# Patient Record
Sex: MALE | Race: WHITE | NOT HISPANIC OR LATINO | Employment: OTHER | URBAN - METROPOLITAN AREA
[De-identification: names, ages, dates, MRNs, and addresses within clinical notes are randomized per-mention and may not be internally consistent; named-entity substitution may affect disease eponyms.]

---

## 2017-01-06 ENCOUNTER — TELEPHONE (OUTPATIENT)
Dept: FAMILY MEDICINE CLINIC | Facility: CLINIC | Age: 72
End: 2017-01-06

## 2017-01-06 NOTE — TELEPHONE ENCOUNTER
----- Message from Germania Mosley sent at 1/6/2017  2:03 PM EST -----  Regarding: verbal physical therapy order  Contact: [phone]  Addie from [facility] would like to get a verbal order to move patient's physical therapy to next week bc his wife has been in the hospital all week.     Gave verbal consent to continue PT.  Kurtis, CMA

## 2017-03-27 ENCOUNTER — OFFICE VISIT (OUTPATIENT)
Dept: FAMILY MEDICINE CLINIC | Facility: CLINIC | Age: 72
End: 2017-03-27

## 2017-03-27 VITALS
SYSTOLIC BLOOD PRESSURE: 126 MMHG | TEMPERATURE: 97.9 F | BODY MASS INDEX: 24.99 KG/M2 | WEIGHT: 201 LBS | DIASTOLIC BLOOD PRESSURE: 62 MMHG | HEIGHT: 75 IN | HEART RATE: 94 BPM | OXYGEN SATURATION: 96 %

## 2017-03-27 DIAGNOSIS — K43.2 INCISIONAL HERNIA, WITHOUT OBSTRUCTION OR GANGRENE: Primary | ICD-10-CM

## 2017-03-27 PROCEDURE — 99213 OFFICE O/P EST LOW 20 MIN: CPT | Performed by: FAMILY MEDICINE

## 2017-03-27 NOTE — PROGRESS NOTES
Subjective   Rylan Hudson is a 71 y.o. male.     History of Present Illness   Left abdominal pain and incision hernia.  Reoccurring pain since surgery.   The following portions of the patient's history were reviewed and updated as appropriate: allergies, current medications, past family history, past medical history, past social history, past surgical history and problem list.    Review of Systems   Constitutional: Negative for appetite change, chills, diaphoresis, fatigue, fever and unexpected weight change.   HENT: Negative for congestion, ear pain, mouth sores, postnasal drip, rhinorrhea, sinus pressure, sneezing, sore throat and trouble swallowing.    Eyes: Negative for pain, redness and visual disturbance.   Respiratory: Negative for apnea, cough, chest tightness, shortness of breath and wheezing.    Cardiovascular: Negative for chest pain, palpitations and leg swelling.   Gastrointestinal: Negative for abdominal distention, blood in stool, constipation, diarrhea and nausea.   Endocrine: Negative for cold intolerance, polydipsia, polyphagia and polyuria.   Genitourinary: Negative for difficulty urinating, dysuria, enuresis, flank pain and urgency.   Musculoskeletal: Negative for arthralgias, back pain, joint swelling, myalgias and neck pain.   Skin: Negative for color change, rash and wound.   Neurological: Negative for dizziness, syncope, weakness, light-headedness and numbness.   Hematological: Negative for adenopathy.   Psychiatric/Behavioral: Negative for agitation, behavioral problems and confusion. The patient is not nervous/anxious.        Objective   Physical Exam   Constitutional: He appears well-developed.   Pulmonary/Chest: Effort normal.   Abdominal:   Tender lower part of abdominal incision with minimal bulge.   Musculoskeletal: He exhibits no edema.   Vitals reviewed.      Assessment/Plan   Rylan was seen today for hernia.    Diagnoses and all orders for this visit:    Incisional hernia,  without obstruction or gangrene    refer to surgery, done at .

## 2017-09-05 ENCOUNTER — OFFICE VISIT (OUTPATIENT)
Dept: FAMILY MEDICINE CLINIC | Facility: CLINIC | Age: 72
End: 2017-09-05

## 2017-09-05 VITALS
SYSTOLIC BLOOD PRESSURE: 118 MMHG | HEART RATE: 84 BPM | RESPIRATION RATE: 16 BRPM | TEMPERATURE: 97.7 F | WEIGHT: 214 LBS | BODY MASS INDEX: 26.75 KG/M2 | DIASTOLIC BLOOD PRESSURE: 60 MMHG

## 2017-09-05 DIAGNOSIS — J06.9 ACUTE URI: Primary | ICD-10-CM

## 2017-09-05 PROCEDURE — 99213 OFFICE O/P EST LOW 20 MIN: CPT | Performed by: FAMILY MEDICINE

## 2017-09-05 RX ORDER — AZITHROMYCIN 250 MG/1
TABLET, FILM COATED ORAL
Qty: 5 TABLET | Refills: 0 | Status: SHIPPED | OUTPATIENT
Start: 2017-09-05 | End: 2017-12-01

## 2017-09-05 NOTE — PROGRESS NOTES
Subjective   Rylan Hudson is a 72 y.o. male.     History of Present Illness   Sore throat, drainage and sinus pressure four days ago. No fever.  Seen by urology last week at  with good report, no reocurance.   The following portions of the patient's history were reviewed and updated as appropriate: allergies, current medications, past family history, past medical history, past social history, past surgical history and problem list.    Review of Systems   HENT: Positive for congestion, postnasal drip, sinus pressure, sneezing and sore throat.    Respiratory: Positive for cough.    Neurological: Positive for headaches.       Objective   Physical Exam   HENT:   Left Ear: Decreased hearing (deaf) is noted.   Mouth/Throat: Oropharynx is clear and moist.   Head congestion.    Neck: Neck supple.   Pulmonary/Chest: Breath sounds normal.   Lymphadenopathy:     He has no cervical adenopathy.   Vitals reviewed.      Assessment/Plan   Rylan was seen today for sinusitis.    Diagnoses and all orders for this visit:    Acute URI  -     azithromycin (ZITHROMAX) 250 MG tablet; Take 2 tablets the first day, then 1 tablet daily for 4 days.

## 2017-12-01 ENCOUNTER — OFFICE VISIT (OUTPATIENT)
Dept: FAMILY MEDICINE CLINIC | Facility: CLINIC | Age: 72
End: 2017-12-01

## 2017-12-01 VITALS
WEIGHT: 227 LBS | SYSTOLIC BLOOD PRESSURE: 112 MMHG | HEART RATE: 72 BPM | RESPIRATION RATE: 16 BRPM | BODY MASS INDEX: 28.37 KG/M2 | DIASTOLIC BLOOD PRESSURE: 78 MMHG | TEMPERATURE: 97.8 F

## 2017-12-01 DIAGNOSIS — E53.8 VITAMIN B 12 DEFICIENCY: Primary | ICD-10-CM

## 2017-12-01 PROCEDURE — 99213 OFFICE O/P EST LOW 20 MIN: CPT | Performed by: FAMILY MEDICINE

## 2017-12-01 NOTE — PROGRESS NOTES
Subjective   Rylan Hudson is a 72 y.o. male.     History of Present Illness   Continues left flank acute discomfort at site of surgery port site from kidney surgery from Oct 24, 2016.  Seen [facility] and no findings on scan.  Did well with hernia repair.   Gaining weight. Dry scalp with dandruff not helped using commercial shampoos.   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 Using hearing aids that help.   Using compression socks for ankle swelling. Varicose veins.   Taking vitamin B 12, discovered low level at  and has helped leg swelling.   The following portions of the patient's history were reviewed and updated as appropriate: allergies, current medications, past family history, past medical history, past social history, past surgical history and problem list.    Review of Systems   Constitutional: Negative.    HENT: Positive for hearing loss.    Respiratory: Negative.        Objective   Physical Exam   Constitutional: He appears well-developed and well-nourished.   Pulmonary/Chest: Effort normal.   Abdominal: Soft. He exhibits no distension.   Vitals reviewed.      Assessment/Plan   Rylan was seen today for follow-up.    Diagnoses and all orders for this visit:    Vitamin B 12 deficiency      Plans to enroll in [employer] for strength building.

## 2018-01-26 ENCOUNTER — OFFICE VISIT (OUTPATIENT)
Dept: FAMILY MEDICINE CLINIC | Facility: CLINIC | Age: 73
End: 2018-01-26

## 2018-01-26 VITALS
SYSTOLIC BLOOD PRESSURE: 124 MMHG | WEIGHT: 229 LBS | HEART RATE: 88 BPM | RESPIRATION RATE: 16 BRPM | BODY MASS INDEX: 28.62 KG/M2 | DIASTOLIC BLOOD PRESSURE: 90 MMHG | TEMPERATURE: 97.8 F

## 2018-01-26 DIAGNOSIS — J06.9 ACUTE URI: Primary | ICD-10-CM

## 2018-01-26 PROCEDURE — 99213 OFFICE O/P EST LOW 20 MIN: CPT | Performed by: FAMILY MEDICINE

## 2018-01-26 RX ORDER — AZITHROMYCIN 250 MG/1
TABLET, FILM COATED ORAL
Qty: 6 TABLET | Refills: 0 | Status: SHIPPED | OUTPATIENT
Start: 2018-01-26 | End: 2018-03-30

## 2018-01-26 NOTE — PROGRESS NOTES
Subjective   Rylan Hudson is a 72 y.o. male.     History of Present Illness   Head congestion, headache three days, throat irritation.  Generally does not feel good. Woke today feverish.  Took Tylenol. Today head pressure.    Followed at  for ear tumor and left face cramps.   The following portions of the patient's history were reviewed and updated as appropriate: allergies, current medications, past family history, past medical history, past social history, past surgical history and problem list.    Review of Systems   Constitutional: Negative.    HENT: Positive for congestion and sore throat.    Respiratory: Negative.    Cardiovascular: Negative.    Gastrointestinal: Negative.    Musculoskeletal: Positive for myalgias and neck pain.   Neurological: Positive for headaches.       Objective   Physical Exam   Constitutional: He appears well-developed. No distress.   HENT:   Mouth/Throat: Oropharynx is clear and moist.   Moderate head congestion.    Neck: Neck supple.   Pulmonary/Chest: Breath sounds normal.   Lymphadenopathy:     He has no cervical adenopathy.   Vitals reviewed.      Assessment/Plan   Rylan was seen today for uri.    Diagnoses and all orders for this visit:    Acute URI  -     azithromycin (ZITHROMAX) 250 MG tablet; Take 2 tablets the first day, then 1 tablet daily on days 2 through 5.

## 2018-03-30 ENCOUNTER — OFFICE VISIT (OUTPATIENT)
Dept: FAMILY MEDICINE CLINIC | Facility: CLINIC | Age: 73
End: 2018-03-30

## 2018-03-30 VITALS
OXYGEN SATURATION: 97 % | TEMPERATURE: 97.9 F | SYSTOLIC BLOOD PRESSURE: 116 MMHG | HEART RATE: 89 BPM | WEIGHT: 237 LBS | DIASTOLIC BLOOD PRESSURE: 70 MMHG | BODY MASS INDEX: 29.62 KG/M2 | RESPIRATION RATE: 16 BRPM

## 2018-03-30 DIAGNOSIS — J06.9 ACUTE URI: Primary | ICD-10-CM

## 2018-03-30 PROCEDURE — 99213 OFFICE O/P EST LOW 20 MIN: CPT | Performed by: FAMILY MEDICINE

## 2018-03-30 RX ORDER — DEXTROMETHORPHAN HYDROBROMIDE AND PROMETHAZINE HYDROCHLORIDE 15; 6.25 MG/5ML; MG/5ML
5 SYRUP ORAL 4 TIMES DAILY PRN
Qty: 118 ML | Refills: 0 | Status: SHIPPED | OUTPATIENT
Start: 2018-03-30 | End: 2018-11-30

## 2018-03-30 RX ORDER — AZITHROMYCIN 250 MG/1
TABLET, FILM COATED ORAL
Qty: 6 TABLET | Refills: 0 | Status: SHIPPED | OUTPATIENT
Start: 2018-03-30 | End: 2018-04-06

## 2018-03-30 NOTE — PROGRESS NOTES
Subjective   Rylan Hudson is a 72 y.o. male.     History of Present Illness   Three days of congestion, not feeling well.  No fever. No chest discomfort.  Today throat irritation. Appetite good, loose stool initially.   The following portions of the patient's history were reviewed and updated as appropriate: allergies, current medications, past family history, past medical history, past social history, past surgical history and problem list.    Review of Systems   Constitutional: Negative for fever.   HENT: Positive for congestion. Negative for sore throat.    Respiratory: Negative for cough and shortness of breath.    Neurological: Positive for dizziness.       Objective   Physical Exam   Constitutional: He appears well-developed.   HENT:   Mouth/Throat: Oropharynx is clear and moist.   Head congestion   Neck: Neck supple.   Pulmonary/Chest: Breath sounds normal.   Lymphadenopathy:     He has no cervical adenopathy.   Vitals reviewed.      Assessment/Plan   Rylan was seen today for uri.    Diagnoses and all orders for this visit:    Acute URI  -     azithromycin (ZITHROMAX) 250 MG tablet; Take 2 tablets the first day, then 1 tablet daily on days 2 through 5.  -     promethazine-dextromethorphan (PROMETHAZINE-DM) 6.25-15 MG/5ML syrup; Take 5 mL by mouth 4 (Four) Times a Day As Needed for Cough.

## 2018-04-06 ENCOUNTER — OFFICE VISIT (OUTPATIENT)
Dept: FAMILY MEDICINE CLINIC | Facility: CLINIC | Age: 73
End: 2018-04-06

## 2018-04-06 VITALS
WEIGHT: 234.4 LBS | DIASTOLIC BLOOD PRESSURE: 68 MMHG | TEMPERATURE: 97.7 F | SYSTOLIC BLOOD PRESSURE: 119 MMHG | HEART RATE: 49 BPM | BODY MASS INDEX: 29.3 KG/M2 | OXYGEN SATURATION: 96 %

## 2018-04-06 DIAGNOSIS — J45.31 MILD PERSISTENT ASTHMATIC BRONCHITIS WITH ACUTE EXACERBATION: ICD-10-CM

## 2018-04-06 DIAGNOSIS — S46.811A TRAPEZIUS STRAIN, RIGHT, INITIAL ENCOUNTER: Primary | ICD-10-CM

## 2018-04-06 DIAGNOSIS — J06.9 ACUTE URI: ICD-10-CM

## 2018-04-06 PROCEDURE — 20552 NJX 1/MLT TRIGGER POINT 1/2: CPT | Performed by: FAMILY MEDICINE

## 2018-04-06 PROCEDURE — 99213 OFFICE O/P EST LOW 20 MIN: CPT | Performed by: FAMILY MEDICINE

## 2018-04-06 RX ORDER — METHYLPREDNISOLONE ACETATE 40 MG/ML
40 INJECTION, SUSPENSION INTRA-ARTICULAR; INTRALESIONAL; INTRAMUSCULAR; SOFT TISSUE ONCE
Status: COMPLETED | OUTPATIENT
Start: 2018-04-06 | End: 2018-04-18

## 2018-04-06 RX ORDER — PREDNISONE 10 MG/1
TABLET ORAL
Qty: 4 TABLET | Refills: 0 | Status: SHIPPED | OUTPATIENT
Start: 2018-04-06 | End: 2018-11-30

## 2018-04-06 RX ORDER — BENZONATATE 100 MG/1
100 CAPSULE ORAL 3 TIMES DAILY PRN
Qty: 20 CAPSULE | Refills: 0 | Status: SHIPPED | OUTPATIENT
Start: 2018-04-06 | End: 2018-11-30

## 2018-04-06 NOTE — PROGRESS NOTES
Subjective   Rylan Hudson is a 72 y.o. male.     Cough   This is a new problem. The current episode started 1 to 4 weeks ago. The problem has been gradually improving. The cough is productive of sputum. Associated symptoms include myalgias (right shoulder.) and shortness of breath. Pertinent negatives include no chest pain, chills or fever. The symptoms are aggravated by lying down (deep breaths). He has tried OTC cough suppressant and prescription cough suppressant for the symptoms. The treatment provided mild relief.        The following portions of the patient's history were reviewed and updated as appropriate: allergies, current medications, past social history and problem list.    Review of Systems   Constitutional: Negative for chills and fever.   Respiratory: Positive for cough and shortness of breath.    Cardiovascular: Negative for chest pain.   Musculoskeletal: Positive for myalgias (right shoulder.).       Objective   /68 (BP Location: Left arm, Patient Position: Sitting, Cuff Size: Adult)   Pulse (!) 49   Temp 97.7 °F (36.5 °C) (Oral)   Wt 106 kg (234 lb 6.4 oz)   SpO2 96%   BMI 29.30 kg/m²   Physical Exam   Constitutional: He appears well-developed and well-nourished.   Cardiovascular: Normal rate and regular rhythm.    Pulmonary/Chest: Effort normal and breath sounds normal.   Musculoskeletal: He exhibits tenderness (right upper back).   Nursing note and vitals reviewed.      Assessment/Plan   Problem List Items Addressed This Visit     None      Visit Diagnoses     Trapezius strain, right, initial encounter    -  Primary    Relevant Orders    Inject Trigger Point, 1 or 2    Acute URI        Mild persistent asthmatic bronchitis with acute exacerbation            Inject Trigger Point, 1 or 2  Date/Time: 4/6/2018 3:03 PM  Performed by: JUDAH QUIROGA  Authorized by: JUDAH QUIROGA   Consent: Verbal consent obtained.  Consent given by: patient  Patient understanding: patient states  understanding of the procedure being performed  Patient consent: the patient's understanding of the procedure matches consent given  Patient identity confirmed: verbally with patient  Local anesthesia used: yes    Anesthesia:  Local anesthesia used: yes  Local Anesthetic: lidocaine 1% without epinephrine  Anesthetic total: 1 mL    Sedation:  Patient sedated: no  Patient tolerance: Patient tolerated the procedure well with no immediate complications  Comments: 40 mg of Depo Medrol with 1 cc lidocaine.  [id number] exp. 2/2019            Drink plenty fluids.    Continue medications as doing.      Finish the prednisone 10 mg 3 a day and Add Prednisone 10 mg daily for 4 more days. #4+0.    Rx for Benzonatate 100 mg three times a day #20+0.    Follow up as needed.              Scribed for Dr Blair Fermin by Luh Early CMA.          I, Blair Fermin MD, personally performed the services described in this documentation, as scribed by Luh Early in my presence, and is both accurate and complete.

## 2018-04-18 RX ADMIN — METHYLPREDNISOLONE ACETATE 40 MG: 40 INJECTION, SUSPENSION INTRA-ARTICULAR; INTRALESIONAL; INTRAMUSCULAR; SOFT TISSUE at 14:04

## 2018-11-30 ENCOUNTER — OFFICE VISIT (OUTPATIENT)
Dept: FAMILY MEDICINE CLINIC | Facility: CLINIC | Age: 73
End: 2018-11-30

## 2018-11-30 VITALS
HEART RATE: 92 BPM | DIASTOLIC BLOOD PRESSURE: 90 MMHG | WEIGHT: 247 LBS | TEMPERATURE: 97.6 F | OXYGEN SATURATION: 97 % | SYSTOLIC BLOOD PRESSURE: 126 MMHG | BODY MASS INDEX: 30.87 KG/M2 | RESPIRATION RATE: 16 BRPM

## 2018-11-30 DIAGNOSIS — J01.90 ACUTE SINUSITIS, RECURRENCE NOT SPECIFIED, UNSPECIFIED LOCATION: Primary | ICD-10-CM

## 2018-11-30 PROCEDURE — 99213 OFFICE O/P EST LOW 20 MIN: CPT | Performed by: FAMILY MEDICINE

## 2018-11-30 RX ORDER — FLUTICASONE PROPIONATE 50 MCG
2 SPRAY, SUSPENSION (ML) NASAL DAILY
Qty: 18 ML | Refills: 1 | Status: SHIPPED | OUTPATIENT
Start: 2018-11-30 | End: 2019-12-06

## 2018-11-30 RX ORDER — CEFUROXIME AXETIL 250 MG/1
250 TABLET ORAL 2 TIMES DAILY
Qty: 20 TABLET | Refills: 0 | Status: SHIPPED | OUTPATIENT
Start: 2018-11-30 | End: 2019-12-06

## 2018-11-30 NOTE — PROGRESS NOTES
Subjective   Rylan Hudson is a 73 y.o. male.     History of Present Illness   Sinus congestion several days, little drainage, not painful.  Breathing through mouth. Took Tylenol and no nose spray.   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   The following portions of the patient's history were reviewed and updated as appropriate: allergies, current medications, past family history, past medical history, past social history, past surgical history and problem list.    Review of Systems   HENT: Positive for congestion and sinus pressure. Negative for postnasal drip and sore throat.    Respiratory: Negative.    Cardiovascular: Negative.        Objective   Physical Exam   Constitutional: He appears well-developed and well-nourished.   Uses rolling walker   HENT:   Right Ear: External ear normal.   Mouth/Throat: Oropharynx is clear and moist.   Small nodular growth left posterior canal, smaller than on past examination.   Neck: Neck supple.   Pulmonary/Chest: Breath sounds normal.   Lymphadenopathy:     He has no cervical adenopathy.   Vitals reviewed.      Assessment/Plan   Rylan was seen today for sinusitis.    Diagnoses and all orders for this visit:    Acute sinusitis, recurrence not specified, unspecified location  -     cefuroxime (CEFTIN) 250 MG tablet; Take 1 tablet by mouth 2 (Two) Times a Day.  -     fluticasone (FLONASE) 50 MCG/ACT nasal spray; 2 sprays into the nostril(s) as directed by provider Daily.

## 2019-12-06 ENCOUNTER — OFFICE VISIT (OUTPATIENT)
Dept: FAMILY MEDICINE CLINIC | Facility: CLINIC | Age: 74
End: 2019-12-06

## 2019-12-06 VITALS
SYSTOLIC BLOOD PRESSURE: 136 MMHG | BODY MASS INDEX: 30.37 KG/M2 | HEIGHT: 72 IN | RESPIRATION RATE: 16 BRPM | TEMPERATURE: 98.6 F | DIASTOLIC BLOOD PRESSURE: 64 MMHG | WEIGHT: 224.2 LBS | HEART RATE: 104 BPM | OXYGEN SATURATION: 96 %

## 2019-12-06 DIAGNOSIS — K52.9 GASTROENTERITIS: Primary | ICD-10-CM

## 2019-12-06 PROCEDURE — 99213 OFFICE O/P EST LOW 20 MIN: CPT | Performed by: FAMILY MEDICINE

## 2019-12-06 RX ORDER — CIPROFLOXACIN 500 MG/1
500 TABLET, FILM COATED ORAL 2 TIMES DAILY
Qty: 14 TABLET | Refills: 0 | Status: SHIPPED | OUTPATIENT
Start: 2019-12-06 | End: 2019-12-14

## 2019-12-06 NOTE — PROGRESS NOTES
"Subjective   Rylan Hudson is a 74 y.o. male seen today for Diarrhea (food/fluids both, since before Thanksgiving).     Diarrhea    The current episode started 1 to 4 weeks ago (10- 14 days). The problem has been unchanged. The stool consistency is described as watery. Associated symptoms include abdominal pain, chills (episodes) and sweats. Pertinent negatives include no fever, vomiting or weight loss. There are no known risk factors. He has tried increased fluids for the symptoms. The treatment provided no relief.        The following portions of the patient's history were reviewed and updated as appropriate: allergies, current medications, past social history and problem list.    Review of Systems   Constitutional: Positive for chills (episodes). Negative for fever and weight loss.   Respiratory: Negative for shortness of breath.    Cardiovascular: Negative for chest pain.   Gastrointestinal: Positive for abdominal pain, diarrhea and nausea (mild). Negative for blood in stool and vomiting.   Genitourinary: Negative.        Objective   /64   Pulse 104   Temp 98.6 °F (37 °C) (Temporal)   Resp 16   Ht 182.9 cm (72\")   Wt 102 kg (224 lb 3.2 oz)   SpO2 96%   BMI 30.41 kg/m²   Physical Exam   Constitutional: He appears well-developed and well-nourished.   Cardiovascular: Regular rhythm.   Pulmonary/Chest: Effort normal and breath sounds normal.   Abdominal: Soft. Bowel sounds are normal. There is no tenderness.   Nursing note and vitals reviewed.      Assessment/Plan   Problem List Items Addressed This Visit     None      Visit Diagnoses     Gastroenteritis    -  Primary    Relevant Medications    ciprofloxacin (CIPRO) 500 MG tablet    Other Relevant Orders    Clostridium Difficile Toxin - Stool, Per Rectum    Stool Culture (Reference Lab) - Stool, Per Rectum        I suspect this represents a gastroenteritis secondary to food poisoning.  Will check cultures and for C. difficile and treat with Cipro.    "   Drink plenty fluids.    Check stool culture and stool for C diff.    Rx for Ciprofloxacin 500 mg twice a day #14+0.    Follow up as needed.            Scribed for Dr Blair Fermin by Luh Early CMA.          I, Blair Fermin MD, personally performed the services described in this documentation, as scribed by Luh Early in my presence, and is both accurate and complete.        (Please note that portions of this note were completed with a voice recognition program. Efforts were made to edit the dictations,but occasionally words are mis transcribed.)

## 2019-12-07 ENCOUNTER — LAB (OUTPATIENT)
Dept: FAMILY MEDICINE CLINIC | Facility: CLINIC | Age: 74
End: 2019-12-07

## 2019-12-07 DIAGNOSIS — K52.9 GASTROENTERITIS: ICD-10-CM

## 2019-12-10 ENCOUNTER — TELEPHONE (OUTPATIENT)
Dept: FAMILY MEDICINE CLINIC | Facility: CLINIC | Age: 74
End: 2019-12-10

## 2019-12-10 NOTE — TELEPHONE ENCOUNTER
SPOKE WITH ANGEL, ADVISED HER THAT THE STOOL RESULTS ARE NOT IN CHART, SHE SAID HE WAS DOING OK EATING BUT TODAY NOT FEELING WELL AND WITH DIARRHEA. TOLD HER DR QUIROGA WOULD BE BACK IN TOMORROW AND YOU WOULD BE ABLE TO CALL HER.

## 2019-12-10 NOTE — TELEPHONE ENCOUNTER
PT wife called inquiring about his lab results. Wife stated  is not doing any better and would like to know what the next step would be? Please call back and advise.

## 2019-12-11 ENCOUNTER — TELEPHONE (OUTPATIENT)
Dept: FAMILY MEDICINE CLINIC | Facility: CLINIC | Age: 74
End: 2019-12-11

## 2019-12-11 NOTE — TELEPHONE ENCOUNTER
I spoke to the patient's spouse on the telephone.  She tells me that the diarrhea seemed to be returning yesterday but today it has improved.  He is having several bowel movements a day most of which are slightly formed none of which are liquid.  I recommended that they continue using a probiotic in the form of align twice a day.  He is finished out his Cipro.  The cultures and check first C. difficile are still pending.  I recommended that the patient's spouse call me again tomorrow so that we can discuss the expected lab results.

## 2019-12-12 NOTE — TELEPHONE ENCOUNTER
[facility] called wanted to let Dr. Fermin know they cant add the test Cdiff a and B toxin to the lab they already have Please give call back to [phone]    Please advise and give call back

## 2019-12-13 LAB
BACTERIA SPEC CULT: NORMAL
BACTERIA SPEC CULT: NORMAL
CAMPYLOBACTER STL CULT: NORMAL
E COLI SXT STL QL IA: NEGATIVE
Lab: NORMAL
SALM + SHIG STL CULT: NORMAL

## 2019-12-14 DIAGNOSIS — K52.9 ENTERITIS: Primary | ICD-10-CM

## 2019-12-14 RX ORDER — METRONIDAZOLE 500 MG/1
500 TABLET ORAL 3 TIMES DAILY
Qty: 30 TABLET | Refills: 0 | Status: SHIPPED | OUTPATIENT
Start: 2019-12-14 | End: 2019-12-23

## 2019-12-23 ENCOUNTER — TELEPHONE (OUTPATIENT)
Dept: FAMILY MEDICINE CLINIC | Facility: CLINIC | Age: 74
End: 2019-12-23

## 2019-12-23 ENCOUNTER — TELEPHONE (OUTPATIENT)
Dept: PEDIATRICS | Facility: OTHER | Age: 74
End: 2019-12-23

## 2019-12-23 RX ORDER — METRONIDAZOLE 500 MG/1
500 TABLET ORAL 3 TIMES DAILY
Qty: 15 TABLET | Refills: 0 | OUTPATIENT
Start: 2019-12-23 | End: 2019-12-26

## 2019-12-23 NOTE — TELEPHONE ENCOUNTER
Patients wife was calling back (see previous message)  regarding his current condition please advise and call wife back tonight [phone]

## 2019-12-23 NOTE — TELEPHONE ENCOUNTER
Pt's wife states that dr bird was suppose call him in a script for his METRONIDAZOLE 5OO MG and she states that when she went to go  the script she was advised that nothing had been called in to the pharmacy. Pt's wife states that he has enough for tonight and will need to get this filled asap as he is suppose to take another dose a 6am. Please give pt a call back at [phone]

## 2019-12-23 NOTE — TELEPHONE ENCOUNTER
Pt is being treated for cdif. He finished his medication this morning, but his wife states that he's not doing much better. She is wanting a call back to discuss maybe another medication.  The pt has an appt on 12/26 but the pt's wife doesn't want him to go several days without any more of the medication.    Ruthie, pt's wife [phone]

## 2019-12-24 NOTE — TELEPHONE ENCOUNTER
Pt's pharmacy called and said that the pt's wife was there to get medication that Dr. Fermin was suppose to call in and they didn't have it, it wasn't escribed it was called in and vasiliy said they don't have record of who he talked to or the medication at all, if this could be escribed or called in asap that would be great.

## 2019-12-26 ENCOUNTER — LAB REQUISITION (OUTPATIENT)
Dept: LAB | Facility: HOSPITAL | Age: 74
End: 2019-12-26

## 2019-12-26 ENCOUNTER — OFFICE VISIT (OUTPATIENT)
Dept: FAMILY MEDICINE CLINIC | Facility: CLINIC | Age: 74
End: 2019-12-26

## 2019-12-26 VITALS
DIASTOLIC BLOOD PRESSURE: 78 MMHG | OXYGEN SATURATION: 95 % | HEIGHT: 72 IN | TEMPERATURE: 98 F | WEIGHT: 236.7 LBS | HEART RATE: 131 BPM | RESPIRATION RATE: 20 BRPM | BODY MASS INDEX: 32.06 KG/M2 | SYSTOLIC BLOOD PRESSURE: 130 MMHG

## 2019-12-26 DIAGNOSIS — R10.84 GENERALIZED ABDOMINAL PAIN: ICD-10-CM

## 2019-12-26 DIAGNOSIS — K52.9 ENTERITIS: ICD-10-CM

## 2019-12-26 DIAGNOSIS — R63.0 ANOREXIA: ICD-10-CM

## 2019-12-26 DIAGNOSIS — R10.84 GENERALIZED ABDOMINAL PAIN: Primary | ICD-10-CM

## 2019-12-26 DIAGNOSIS — Z00.00 ROUTINE GENERAL MEDICAL EXAMINATION AT A HEALTH CARE FACILITY: ICD-10-CM

## 2019-12-26 LAB — C DIFF TOX GENS STL QL NAA+PROBE: NOT DETECTED

## 2019-12-26 PROCEDURE — 87493 C DIFF AMPLIFIED PROBE: CPT

## 2019-12-26 PROCEDURE — 99213 OFFICE O/P EST LOW 20 MIN: CPT | Performed by: FAMILY MEDICINE

## 2019-12-26 NOTE — PROGRESS NOTES
"Subjective   Rylan Hudson is a 74 y.o. male seen today for office visit (cdif).     History of Present Illness   The patient is here today for a follow up on Diarrhea.    States he is doing some better. States he had a BM prior to todays visit and it was almost normal. Did notice some mucus in it.    Has some intermittent abdominal cramps. Appetite is very poor. Gets nauseated with eating. No energy and decreased strength.  Denies any fever or chills.  Denies any chest pain or shortness of breath.    Has been drinking plenty fluids. Ginger ale and Gatorade.    The following portions of the patient's history were reviewed and updated as appropriate: allergies, current medications, past social history and problem list.    Review of Systems   Constitutional: Positive for appetite change (decreased) and fatigue. Negative for chills and fever.   Respiratory: Negative for shortness of breath.    Cardiovascular: Negative for chest pain.   Gastrointestinal: Positive for diarrhea. Negative for abdominal pain and vomiting.   Neurological: Positive for weakness.       Objective   /78   Pulse (!) 131   Temp 98 °F (36.7 °C)   Resp 20   Ht 182.9 cm (72\")   Wt 107 kg (236 lb 11.2 oz)   SpO2 95%   BMI 32.10 kg/m²   Physical Exam   Constitutional: He appears well-developed and well-nourished.   Pulmonary/Chest: Effort normal and breath sounds normal.   Abdominal: Soft. There is no tenderness.   Hyperactive bowel sounds.   Nursing note and vitals reviewed.      Assessment/Plan   Problem List Items Addressed This Visit     None      Visit Diagnoses     Generalized abdominal pain    -  Primary    Relevant Orders    Lipase (Completed)    Amylase (Completed)    CT Abdomen Pelvis Without Contrast    Anorexia        Enteritis        Relevant Orders    Clostridium Difficile Toxin - Stool, Per Rectum        The patient has had a persistent diarrhea that has improved with treatment with metronidazole.  His previous stool " cultures were negative for enteric pathogens but unfortunately the test for C. difficile toxin was not performed.  We are in the process of rechecking for that.  I will have him go ahead and finish out his 10-day course of metronidazole.  It is possible that his anorexia is related to that medication.  It is less clear as to what is causing his abdominal pain that persists.  We will therefore check a CT of the abdomen and have him return to see me after he has been off of the metronidazole for a few days.  Hopefully his diarrhea will not recur.      Drink plenty fluids.    Stop the Metronidazole.    Continue other medications as doing.    Check a CBC,CMP,Amylase, and Lipase. and drop off the stool specimen for c diff.     Schedule for a CT of the abdomen and pelvis without IV contrast.    Follow up in 4 days.              Scribed for Dr Blair Fermin by Luh Early CMA.          I, Blair Fermin MD, personally performed the services described in this documentation, as scribed by Luh Early in my presence, and is both accurate and complete.        (Please note that portions of this note were completed with a voice recognition program. Efforts were made to edit the dictations,but occasionally words are mis transcribed.)

## 2019-12-27 ENCOUNTER — TELEPHONE (OUTPATIENT)
Dept: FAMILY MEDICINE CLINIC | Facility: CLINIC | Age: 74
End: 2019-12-27

## 2019-12-27 LAB
AMYLASE SERPL-CCNC: 61 U/L (ref 28–100)
LIPASE SERPL-CCNC: 54 U/L (ref 13–60)

## 2019-12-27 NOTE — TELEPHONE ENCOUNTER
APPT IS SCHEDULED FOR 1/2/2020 AT 1  [facility] [facility] W[facility] NOTIFIED

## 2019-12-27 NOTE — TELEPHONE ENCOUNTER
Pt wife called stating [employer] contacted them and stating pt has been approved for the ct scan. Wife was calling to inquire when the appt will be set up. She would like someone to give her a call and let her know when they can come in for the ct scan?

## 2019-12-27 NOTE — TELEPHONE ENCOUNTER
Ruthie said when pt was in office yesterday that the pt was told the doctor wanted to see him on Monday but no appt was made.  Ruthie is wanting to know if he still needs to be seen on Monday or to wait until after the scan on Kai [facility].  Ruthie requesting call back to clarify.

## 2019-12-30 ENCOUNTER — TELEPHONE (OUTPATIENT)
Dept: FAMILY MEDICINE CLINIC | Facility: CLINIC | Age: 74
End: 2019-12-30

## 2019-12-30 NOTE — TELEPHONE ENCOUNTER
Wife of patient calling to get results from the labs run on 12/26/19.  Also calling about the stool specimen results.    Please call at home - [phone]

## 2019-12-30 NOTE — TELEPHONE ENCOUNTER
I spoke to the patient's spouse on the telephone.  I reported the results of his recent laboratory studies.  His amylase and lipase were normal and the stool for C. difficile toxin was negative.  He is feeling well except for being very weak and tired.  He is to undergo a CT of the abdomen this Friday.  We will wait to see him after that.  In the meantime he is to drink plenty of fluids including Gatorade or Sprite as well as fruits and vegetables.

## 2020-01-06 ENCOUNTER — TELEPHONE (OUTPATIENT)
Dept: FAMILY MEDICINE CLINIC | Facility: CLINIC | Age: 75
End: 2020-01-06

## 2020-01-06 NOTE — TELEPHONE ENCOUNTER
I spoke to the patient's spouse who is accompanied by her daughter here at the office today.  I related that the stool for C. difficile toxin returned with negative results.  The patient on CT of the abdomen had findings consistent with a colitis at the mid sigmoid colon.  This therefore could represent a diverticulitis.  I recommended that the patient return to see me here at the office.  He also has complaints of fatigue and I believe we may need to repeat some laboratory studies and see if he should be treated with another course of antibiotics or not.  The family tells me that he is checked in his refusal of getting a colonoscopy.

## 2020-01-08 ENCOUNTER — HOSPITAL ENCOUNTER (INPATIENT)
Facility: HOSPITAL | Age: 75
LOS: 2 days | Discharge: ANOTHER HOSPITAL | End: 2020-01-10
Attending: HOSPITALIST | Admitting: INTERNAL MEDICINE

## 2020-01-08 ENCOUNTER — OFFICE VISIT (OUTPATIENT)
Dept: FAMILY MEDICINE CLINIC | Facility: CLINIC | Age: 75
End: 2020-01-08

## 2020-01-08 ENCOUNTER — APPOINTMENT (OUTPATIENT)
Dept: CT IMAGING | Facility: HOSPITAL | Age: 75
End: 2020-01-08

## 2020-01-08 VITALS
DIASTOLIC BLOOD PRESSURE: 60 MMHG | HEIGHT: 72 IN | SYSTOLIC BLOOD PRESSURE: 120 MMHG | BODY MASS INDEX: 31.42 KG/M2 | HEART RATE: 126 BPM | OXYGEN SATURATION: 95 % | WEIGHT: 232 LBS | RESPIRATION RATE: 22 BRPM | TEMPERATURE: 101.9 F

## 2020-01-08 DIAGNOSIS — R50.9 FEVER AND CHILLS: Primary | ICD-10-CM

## 2020-01-08 DIAGNOSIS — K57.32 SIGMOID DIVERTICULITIS: ICD-10-CM

## 2020-01-08 PROBLEM — A41.9 SEPSIS (HCC): Status: ACTIVE | Noted: 2020-01-08

## 2020-01-08 PROBLEM — IMO0002 SOLITARY KIDNEY: Status: ACTIVE | Noted: 2020-01-08

## 2020-01-08 PROBLEM — K57.92 DIVERTICULITIS: Status: ACTIVE | Noted: 2020-01-08

## 2020-01-08 LAB
ALBUMIN SERPL-MCNC: 3.3 G/DL (ref 3.5–5.2)
ALBUMIN/GLOB SERPL: 0.9 G/DL
ALP SERPL-CCNC: 58 U/L (ref 39–117)
ALT SERPL W P-5'-P-CCNC: 9 U/L (ref 1–41)
ANION GAP SERPL CALCULATED.3IONS-SCNC: 12 MMOL/L (ref 5–15)
AST SERPL-CCNC: 12 U/L (ref 1–40)
BILIRUB SERPL-MCNC: 0.6 MG/DL (ref 0.2–1.2)
BUN BLD-MCNC: 31 MG/DL (ref 8–23)
BUN/CREAT SERPL: 21.7 (ref 7–25)
CALCIUM SPEC-SCNC: 8.8 MG/DL (ref 8.6–10.5)
CHLORIDE SERPL-SCNC: 101 MMOL/L (ref 98–107)
CO2 SERPL-SCNC: 23 MMOL/L (ref 22–29)
CREAT BLD-MCNC: 1.43 MG/DL (ref 0.76–1.27)
EXPIRATION DATE: NORMAL
FLUAV AG NPH QL: NEGATIVE
FLUBV AG NPH QL: NEGATIVE
GFR SERPL CREATININE-BSD FRML MDRD: 48 ML/MIN/1.73
GLOBULIN UR ELPH-MCNC: 3.8 GM/DL
GLUCOSE BLD-MCNC: 110 MG/DL (ref 65–99)
INTERNAL CONTROL: NORMAL
Lab: NORMAL
POTASSIUM BLD-SCNC: 3.7 MMOL/L (ref 3.5–5.2)
PROCALCITONIN SERPL-MCNC: 0.34 NG/ML (ref 0.1–0.25)
PROT SERPL-MCNC: 7.1 G/DL (ref 6–8.5)
SODIUM BLD-SCNC: 136 MMOL/L (ref 136–145)

## 2020-01-08 PROCEDURE — 86900 BLOOD TYPING SEROLOGIC ABO: CPT

## 2020-01-08 PROCEDURE — 99223 1ST HOSP IP/OBS HIGH 75: CPT | Performed by: HOSPITALIST

## 2020-01-08 PROCEDURE — 84145 PROCALCITONIN (PCT): CPT | Performed by: PHYSICIAN ASSISTANT

## 2020-01-08 PROCEDURE — 99214 OFFICE O/P EST MOD 30 MIN: CPT | Performed by: FAMILY MEDICINE

## 2020-01-08 PROCEDURE — 25010000002 MEROPENEM PER 100 MG: Performed by: PHYSICIAN ASSISTANT

## 2020-01-08 PROCEDURE — 87040 BLOOD CULTURE FOR BACTERIA: CPT | Performed by: PHYSICIAN ASSISTANT

## 2020-01-08 PROCEDURE — 85025 COMPLETE CBC W/AUTO DIFF WBC: CPT | Performed by: HOSPITALIST

## 2020-01-08 PROCEDURE — 85060 BLOOD SMEAR INTERPRETATION: CPT | Performed by: INTERNAL MEDICINE

## 2020-01-08 PROCEDURE — 85007 BL SMEAR W/DIFF WBC COUNT: CPT | Performed by: PHYSICIAN ASSISTANT

## 2020-01-08 PROCEDURE — 74176 CT ABD & PELVIS W/O CONTRAST: CPT

## 2020-01-08 PROCEDURE — 85007 BL SMEAR W/DIFF WBC COUNT: CPT | Performed by: INTERNAL MEDICINE

## 2020-01-08 PROCEDURE — 85025 COMPLETE CBC W/AUTO DIFF WBC: CPT | Performed by: PHYSICIAN ASSISTANT

## 2020-01-08 PROCEDURE — 87804 INFLUENZA ASSAY W/OPTIC: CPT | Performed by: FAMILY MEDICINE

## 2020-01-08 PROCEDURE — 86901 BLOOD TYPING SEROLOGIC RH(D): CPT

## 2020-01-08 PROCEDURE — 80053 COMPREHEN METABOLIC PANEL: CPT | Performed by: PHYSICIAN ASSISTANT

## 2020-01-08 RX ORDER — SODIUM CHLORIDE 9 MG/ML
100 INJECTION, SOLUTION INTRAVENOUS CONTINUOUS
Status: DISCONTINUED | OUTPATIENT
Start: 2020-01-08 | End: 2020-01-10 | Stop reason: HOSPADM

## 2020-01-08 RX ORDER — ONDANSETRON 4 MG/1
4 TABLET, FILM COATED ORAL EVERY 6 HOURS PRN
Status: DISCONTINUED | OUTPATIENT
Start: 2020-01-08 | End: 2020-01-10 | Stop reason: HOSPADM

## 2020-01-08 RX ORDER — SODIUM CHLORIDE 0.9 % (FLUSH) 0.9 %
10 SYRINGE (ML) INJECTION EVERY 12 HOURS SCHEDULED
Status: DISCONTINUED | OUTPATIENT
Start: 2020-01-08 | End: 2020-01-10 | Stop reason: HOSPADM

## 2020-01-08 RX ORDER — ACETAMINOPHEN 160 MG/5ML
650 SOLUTION ORAL EVERY 4 HOURS PRN
Status: DISCONTINUED | OUTPATIENT
Start: 2020-01-08 | End: 2020-01-10 | Stop reason: HOSPADM

## 2020-01-08 RX ORDER — CHOLECALCIFEROL (VITAMIN D3) 125 MCG
5 CAPSULE ORAL NIGHTLY PRN
Status: DISCONTINUED | OUTPATIENT
Start: 2020-01-08 | End: 2020-01-10 | Stop reason: HOSPADM

## 2020-01-08 RX ORDER — SODIUM CHLORIDE 0.9 % (FLUSH) 0.9 %
10 SYRINGE (ML) INJECTION AS NEEDED
Status: DISCONTINUED | OUTPATIENT
Start: 2020-01-08 | End: 2020-01-10 | Stop reason: HOSPADM

## 2020-01-08 RX ORDER — UBIDECARENONE 75 MG
50 CAPSULE ORAL DAILY
COMMUNITY
End: 2020-01-10 | Stop reason: HOSPADM

## 2020-01-08 RX ORDER — SODIUM CHLORIDE 9 MG/ML
100 INJECTION, SOLUTION INTRAVENOUS CONTINUOUS
Status: DISCONTINUED | OUTPATIENT
Start: 2020-01-08 | End: 2020-01-08 | Stop reason: SDUPTHER

## 2020-01-08 RX ORDER — ACETAMINOPHEN 325 MG/1
650 TABLET ORAL EVERY 4 HOURS PRN
Status: DISCONTINUED | OUTPATIENT
Start: 2020-01-08 | End: 2020-01-10 | Stop reason: HOSPADM

## 2020-01-08 RX ORDER — ACETAMINOPHEN 650 MG/1
650 SUPPOSITORY RECTAL EVERY 4 HOURS PRN
Status: DISCONTINUED | OUTPATIENT
Start: 2020-01-08 | End: 2020-01-10 | Stop reason: HOSPADM

## 2020-01-08 RX ADMIN — MEROPENEM 1 G: 1 INJECTION, POWDER, FOR SOLUTION INTRAVENOUS at 23:33

## 2020-01-08 RX ADMIN — SODIUM CHLORIDE 100 ML/HR: 9 INJECTION, SOLUTION INTRAVENOUS at 22:49

## 2020-01-08 NOTE — PROGRESS NOTES
"Subjective   Rylan Hudson is a 74 y.o. male seen today for Fever.     Fever    This is a new problem. The current episode started today. The maximum temperature noted was 101 to 101.9 F. The temperature was taken using an oral thermometer. Associated symptoms include coughing, diarrhea (improving) and nausea. Pertinent negatives include no chest pain, sore throat or vomiting. He has tried fluids for the symptoms. The treatment provided mild relief.        The following portions of the patient's history were reviewed and updated as appropriate: allergies, current medications, past social history and problem list.    Review of Systems   Constitutional: Positive for chills, fatigue and fever.   HENT: Negative for sore throat.    Respiratory: Positive for cough. Negative for shortness of breath.    Cardiovascular: Negative for chest pain.   Gastrointestinal: Positive for diarrhea (improving) and nausea. Negative for vomiting.   Genitourinary: Negative.        Objective   /60   Pulse (!) 126   Temp (!) 101.9 °F (38.8 °C)   Resp 22   Ht 182.9 cm (72\")   Wt 105 kg (232 lb)   SpO2 95%   BMI 31.46 kg/m²   Physical Exam   Constitutional: He appears well-developed and well-nourished.   HENT:   Right Ear: External ear normal.   Left Ear: External ear normal.   Mouth/Throat: Oropharynx is clear and moist.   Eyes: Pupils are equal, round, and reactive to light. Conjunctivae and EOM are normal.   Cardiovascular: Normal rate and regular rhythm.   Pulmonary/Chest: Effort normal and breath sounds normal.   Abdominal: Soft. There is no tenderness.   Nursing note and vitals reviewed.      Assessment/Plan   Problem List Items Addressed This Visit     None      Visit Diagnoses     Fever and chills    -  Primary    Relevant Orders    POCT Influenza A/B (Completed)        The patient has a preceding history of diarrhea that initially was thought to represent an enteritis and later was concern mainly for C. difficile " enteritis.  Cultures and testing for C. difficile were negative.  The patient was treated with metronidazole 5 mg twice a day for 10 days.  He did have response to that treatment.  However the diarrhea returned.  A CT of the abdomen is suggestive of sigmoid diverticulitis.  The patient denies any difficulty with dysuria or with cough.  Testing for influenza today is negative.  He is developed a fever today with a temperature at present of 101.9 degrees.  I recommended hospitalization for management of what appears to be sigmoid diverticulitis refractory to outpatient therapy.  I contacted the hospitalist at [facility] Dr. RAZO.  We agreed that inpatient therapy was appropriate.  The patient is amenable to this.  He is being transported by family car to direct admit.      Advised due to continued fatigue and now fever we will transition to Hospitalist for admission.        Scribed for Dr Blair Fermin by Luh Early CMA.          I, Blair Fermin MD, personally performed the services described in this documentation, as scribed by Luh Early in my presence, and is both accurate and complete.        (Please note that portions of this note were completed with a voice recognition program. Efforts were made to edit the dictations,but occasionally words are mis transcribed.)

## 2020-01-09 ENCOUNTER — APPOINTMENT (OUTPATIENT)
Dept: CARDIOLOGY | Facility: HOSPITAL | Age: 75
End: 2020-01-09

## 2020-01-09 PROBLEM — D64.9 ANEMIA: Status: ACTIVE | Noted: 2020-01-09

## 2020-01-09 PROBLEM — D69.6 THROMBOCYTOPENIA: Status: ACTIVE | Noted: 2020-01-09

## 2020-01-09 PROBLEM — D61.818 PANCYTOPENIA: Status: ACTIVE | Noted: 2020-01-09

## 2020-01-09 LAB
ABO GROUP BLD: NORMAL
ABO GROUP BLD: NORMAL
ANION GAP SERPL CALCULATED.3IONS-SCNC: 14 MMOL/L (ref 5–15)
BASOPHILS # BLD AUTO: 0 10*3/MM3 (ref 0–0.2)
BASOPHILS # BLD AUTO: 0 10*3/MM3 (ref 0–0.2)
BASOPHILS # BLD MANUAL: 0 10*3/MM3 (ref 0–0.2)
BASOPHILS NFR BLD AUTO: 0 % (ref 0–1.5)
BH CV ECHO MEAS - BSA(HAYCOCK): 2.4 M^2
BH CV ECHO MEAS - BSA: 2.3 M^2
BH CV ECHO MEAS - BZI_BMI: 32 KILOGRAMS/M^2
BH CV ECHO MEAS - BZI_METRIC_HEIGHT: 182.9 CM
BH CV ECHO MEAS - BZI_METRIC_WEIGHT: 107.1 KG
BH CV LOWER VASCULAR LEFT COMMON FEMORAL AUGMENT: NORMAL
BH CV LOWER VASCULAR LEFT COMMON FEMORAL COMPRESS: NORMAL
BH CV LOWER VASCULAR LEFT COMMON FEMORAL PHASIC: NORMAL
BH CV LOWER VASCULAR LEFT COMMON FEMORAL SPONT: NORMAL
BH CV LOWER VASCULAR RIGHT COMMON FEMORAL AUGMENT: NORMAL
BH CV LOWER VASCULAR RIGHT COMMON FEMORAL COMPRESS: NORMAL
BH CV LOWER VASCULAR RIGHT COMMON FEMORAL PHASIC: NORMAL
BH CV LOWER VASCULAR RIGHT COMMON FEMORAL SPONT: NORMAL
BH CV LOWER VASCULAR RIGHT DISTAL FEMORAL COMPRESS: NORMAL
BH CV LOWER VASCULAR RIGHT GASTRONEMIUS COMPRESS: NORMAL
BH CV LOWER VASCULAR RIGHT GREATER SAPH AK COMPRESS: NORMAL
BH CV LOWER VASCULAR RIGHT GREATER SAPH BK COMPRESS: NORMAL
BH CV LOWER VASCULAR RIGHT LESSER SAPH COMPRESS: NORMAL
BH CV LOWER VASCULAR RIGHT MID FEMORAL AUGMENT: NORMAL
BH CV LOWER VASCULAR RIGHT MID FEMORAL COMPRESS: NORMAL
BH CV LOWER VASCULAR RIGHT MID FEMORAL PHASIC: NORMAL
BH CV LOWER VASCULAR RIGHT MID FEMORAL SPONT: NORMAL
BH CV LOWER VASCULAR RIGHT PERONEAL COMPRESS: NORMAL
BH CV LOWER VASCULAR RIGHT POPLITEAL AUGMENT: NORMAL
BH CV LOWER VASCULAR RIGHT POPLITEAL COMPRESS: NORMAL
BH CV LOWER VASCULAR RIGHT POPLITEAL PHASIC: NORMAL
BH CV LOWER VASCULAR RIGHT POPLITEAL SPONT: NORMAL
BH CV LOWER VASCULAR RIGHT POSTERIOR TIBIAL COMPRESS: NORMAL
BH CV LOWER VASCULAR RIGHT PROFUNDA FEMORAL COMPRESS: NORMAL
BH CV LOWER VASCULAR RIGHT PROXIMAL FEMORAL COMPRESS: NORMAL
BH CV LOWER VASCULAR RIGHT SAPHENOFEMORAL JUNCTION AUGMENT: NORMAL
BH CV LOWER VASCULAR RIGHT SAPHENOFEMORAL JUNCTION COMPRESS: NORMAL
BH CV LOWER VASCULAR RIGHT SAPHENOFEMORAL JUNCTION PHASIC: NORMAL
BH CV LOWER VASCULAR RIGHT SAPHENOFEMORAL JUNCTION SPONT: NORMAL
BLD GP AB SCN SERPL QL: NEGATIVE
BUN BLD-MCNC: 27 MG/DL (ref 8–23)
BUN/CREAT SERPL: 19.9 (ref 7–25)
C DIFF TOX GENS STL QL NAA+PROBE: DETECTED
CALCIUM SPEC-SCNC: 9 MG/DL (ref 8.6–10.5)
CHLORIDE SERPL-SCNC: 98 MMOL/L (ref 98–107)
CO2 SERPL-SCNC: 25 MMOL/L (ref 22–29)
CREAT BLD-MCNC: 1.36 MG/DL (ref 0.76–1.27)
CYTOLOGIST CVX/VAG CYTO: NORMAL
D-LACTATE SERPL-SCNC: 1.1 MMOL/L (ref 0.5–2)
DEPRECATED RDW RBC AUTO: 57.5 FL (ref 37–54)
DEPRECATED RDW RBC AUTO: 58.2 FL (ref 37–54)
DEPRECATED RDW RBC AUTO: 63.1 FL (ref 37–54)
EOSINOPHIL # BLD AUTO: 0 10*3/MM3 (ref 0–0.4)
EOSINOPHIL # BLD AUTO: 0 10*3/MM3 (ref 0–0.4)
EOSINOPHIL # BLD MANUAL: 0 10*3/MM3 (ref 0–0.4)
EOSINOPHIL NFR BLD AUTO: 0 % (ref 0.3–6.2)
EOSINOPHIL NFR BLD AUTO: 0 % (ref 0.3–6.2)
EOSINOPHIL NFR BLD MANUAL: 0 % (ref 0.3–6.2)
ERYTHROCYTE [DISTWIDTH] IN BLOOD BY AUTOMATED COUNT: 16.3 % (ref 12.3–15.4)
ERYTHROCYTE [DISTWIDTH] IN BLOOD BY AUTOMATED COUNT: 16.7 % (ref 12.3–15.4)
ERYTHROCYTE [DISTWIDTH] IN BLOOD BY AUTOMATED COUNT: 17.5 % (ref 12.3–15.4)
FERRITIN SERPL-MCNC: 2161 NG/ML (ref 30–400)
FOLATE SERPL-MCNC: 17.5 NG/ML (ref 4.78–24.2)
GFR SERPL CREATININE-BSD FRML MDRD: 51 ML/MIN/1.73
GLUCOSE BLD-MCNC: 102 MG/DL (ref 65–99)
HCT VFR BLD AUTO: 14.3 % (ref 37.5–51)
HCT VFR BLD AUTO: 14.6 % (ref 37.5–51)
HCT VFR BLD AUTO: 23.3 % (ref 37.5–51)
HGB BLD-MCNC: 5 G/DL (ref 13–17.7)
HGB BLD-MCNC: 5 G/DL (ref 13–17.7)
HGB BLD-MCNC: 7.6 G/DL (ref 13–17.7)
IMM GRANULOCYTES # BLD AUTO: 0 10*3/MM3 (ref 0–0.05)
IMM GRANULOCYTES # BLD AUTO: 0.02 10*3/MM3 (ref 0–0.05)
IMM GRANULOCYTES NFR BLD AUTO: 0 % (ref 0–0.5)
IMM GRANULOCYTES NFR BLD AUTO: 3.8 % (ref 0–0.5)
IRON 24H UR-MRATE: 204 MCG/DL (ref 59–158)
IRON SATN MFR SERPL: 82 % (ref 20–50)
LYMPHOCYTES # BLD AUTO: 0.37 10*3/MM3 (ref 0.7–3.1)
LYMPHOCYTES # BLD AUTO: 0.4 10*3/MM3 (ref 0.7–3.1)
LYMPHOCYTES # BLD MANUAL: 0.51 10*3/MM3 (ref 0.7–3.1)
LYMPHOCYTES NFR BLD AUTO: 76.9 % (ref 19.6–45.3)
LYMPHOCYTES NFR BLD AUTO: 77.1 % (ref 19.6–45.3)
LYMPHOCYTES NFR BLD MANUAL: 0 % (ref 5–12)
LYMPHOCYTES NFR BLD MANUAL: 96.2 % (ref 19.6–45.3)
MACROCYTES BLD QL SMEAR: NORMAL
MCH RBC QN AUTO: 34.2 PG (ref 26.6–33)
MCH RBC QN AUTO: 35 PG (ref 26.6–33)
MCH RBC QN AUTO: 35.7 PG (ref 26.6–33)
MCHC RBC AUTO-ENTMCNC: 32.6 G/DL (ref 31.5–35.7)
MCHC RBC AUTO-ENTMCNC: 34.2 G/DL (ref 31.5–35.7)
MCHC RBC AUTO-ENTMCNC: 35 G/DL (ref 31.5–35.7)
MCV RBC AUTO: 102.1 FL (ref 79–97)
MCV RBC AUTO: 102.1 FL (ref 79–97)
MCV RBC AUTO: 105 FL (ref 79–97)
MONOCYTES # BLD AUTO: 0 10*3/MM3 (ref 0.1–0.9)
MONOCYTES # BLD AUTO: 0.03 10*3/MM3 (ref 0.1–0.9)
MONOCYTES # BLD AUTO: 0.03 10*3/MM3 (ref 0.1–0.9)
MONOCYTES NFR BLD AUTO: 5.8 % (ref 5–12)
MONOCYTES NFR BLD AUTO: 6.3 % (ref 5–12)
NEUTROPHILS # BLD AUTO: 0.02 10*3/MM3 (ref 1.7–7)
NEUTROPHILS # BLD AUTO: 0.07 10*3/MM3 (ref 1.7–7)
NEUTROPHILS # BLD AUTO: 0.08 10*3/MM3 (ref 1.7–7)
NEUTROPHILS NFR BLD AUTO: 13.5 % (ref 42.7–76)
NEUTROPHILS NFR BLD AUTO: 16.6 % (ref 42.7–76)
NEUTROPHILS NFR BLD MANUAL: 3.8 % (ref 42.7–76)
NRBC BLD AUTO-RTO: 0 /100 WBC (ref 0–0.2)
NRBC BLD AUTO-RTO: 3.8 /100 WBC (ref 0–0.2)
PATH INTERP BLD-IMP: NORMAL
PLAT MORPH BLD: NORMAL
PLATELET # BLD AUTO: 34 10*3/MM3 (ref 140–450)
PLATELET # BLD AUTO: 8 10*3/MM3 (ref 140–450)
PLATELET # BLD AUTO: 8 10*3/MM3 (ref 140–450)
PMV BLD AUTO: 11.6 FL (ref 6–12)
PMV BLD AUTO: 12.1 FL (ref 6–12)
PMV BLD AUTO: 9.7 FL (ref 6–12)
POTASSIUM BLD-SCNC: 3.2 MMOL/L (ref 3.5–5.2)
RBC # BLD AUTO: 1.4 10*6/MM3 (ref 4.14–5.8)
RBC # BLD AUTO: 1.43 10*6/MM3 (ref 4.14–5.8)
RBC # BLD AUTO: 2.22 10*6/MM3 (ref 4.14–5.8)
RBC MORPH BLD: NORMAL
RETICS # AUTO: 0.04 10*6/MM3 (ref 0.02–0.13)
RETICS/RBC NFR AUTO: 1.61 % (ref 0.7–1.9)
RH BLD: POSITIVE
RH BLD: POSITIVE
SCAN SLIDE: NORMAL
SMALL PLATELETS BLD QL SMEAR: NORMAL
SODIUM BLD-SCNC: 137 MMOL/L (ref 136–145)
T&S EXPIRATION DATE: NORMAL
TIBC SERPL-MCNC: 249 MCG/DL (ref 298–536)
TRANSFERRIN SERPL-MCNC: 167 MG/DL (ref 200–360)
VIT B12 BLD-MCNC: >2000 PG/ML (ref 211–946)
WBC MORPH BLD: NORMAL
WBC MORPH BLD: NORMAL
WBC NRBC COR # BLD: 0.48 10*3/MM3 (ref 3.4–10.8)
WBC NRBC COR # BLD: 0.52 10*3/MM3 (ref 3.4–10.8)
WBC NRBC COR # BLD: 0.53 10*3/MM3 (ref 3.4–10.8)

## 2020-01-09 PROCEDURE — 86900 BLOOD TYPING SEROLOGIC ABO: CPT | Performed by: HOSPITALIST

## 2020-01-09 PROCEDURE — 93971 EXTREMITY STUDY: CPT

## 2020-01-09 PROCEDURE — P9037 PLATE PHERES LEUKOREDU IRRAD: HCPCS

## 2020-01-09 PROCEDURE — 87493 C DIFF AMPLIFIED PROBE: CPT | Performed by: INTERNAL MEDICINE

## 2020-01-09 PROCEDURE — 86901 BLOOD TYPING SEROLOGIC RH(D): CPT | Performed by: HOSPITALIST

## 2020-01-09 PROCEDURE — 82607 VITAMIN B-12: CPT | Performed by: HOSPITALIST

## 2020-01-09 PROCEDURE — 99233 SBSQ HOSP IP/OBS HIGH 50: CPT | Performed by: INTERNAL MEDICINE

## 2020-01-09 PROCEDURE — 83605 ASSAY OF LACTIC ACID: CPT | Performed by: PHYSICIAN ASSISTANT

## 2020-01-09 PROCEDURE — 84466 ASSAY OF TRANSFERRIN: CPT | Performed by: HOSPITALIST

## 2020-01-09 PROCEDURE — 85025 COMPLETE CBC W/AUTO DIFF WBC: CPT | Performed by: HOSPITALIST

## 2020-01-09 PROCEDURE — 80048 BASIC METABOLIC PNL TOTAL CA: CPT | Performed by: HOSPITALIST

## 2020-01-09 PROCEDURE — 99223 1ST HOSP IP/OBS HIGH 75: CPT | Performed by: INTERNAL MEDICINE

## 2020-01-09 PROCEDURE — 25010000002 MEROPENEM PER 100 MG: Performed by: PHYSICIAN ASSISTANT

## 2020-01-09 PROCEDURE — 82728 ASSAY OF FERRITIN: CPT | Performed by: HOSPITALIST

## 2020-01-09 PROCEDURE — 86850 RBC ANTIBODY SCREEN: CPT | Performed by: HOSPITALIST

## 2020-01-09 PROCEDURE — 83540 ASSAY OF IRON: CPT | Performed by: HOSPITALIST

## 2020-01-09 PROCEDURE — 82746 ASSAY OF FOLIC ACID SERUM: CPT | Performed by: HOSPITALIST

## 2020-01-09 PROCEDURE — 85007 BL SMEAR W/DIFF WBC COUNT: CPT | Performed by: HOSPITALIST

## 2020-01-09 PROCEDURE — 86923 COMPATIBILITY TEST ELECTRIC: CPT

## 2020-01-09 PROCEDURE — 85045 AUTOMATED RETICULOCYTE COUNT: CPT | Performed by: HOSPITALIST

## 2020-01-09 PROCEDURE — P9016 RBC LEUKOCYTES REDUCED: HCPCS

## 2020-01-09 PROCEDURE — 0097U HC BIOFIRE FILMARRAY GI PANEL: CPT | Performed by: INTERNAL MEDICINE

## 2020-01-09 PROCEDURE — 86900 BLOOD TYPING SEROLOGIC ABO: CPT

## 2020-01-09 PROCEDURE — 36430 TRANSFUSION BLD/BLD COMPNT: CPT

## 2020-01-09 RX ORDER — UBIDECARENONE 100 MG
100 CAPSULE ORAL DAILY
COMMUNITY
End: 2020-01-10 | Stop reason: HOSPADM

## 2020-01-09 RX ORDER — FERROUS SULFATE TAB EC 324 MG (65 MG FE EQUIVALENT) 324 (65 FE) MG
324 TABLET DELAYED RESPONSE ORAL
COMMUNITY
End: 2020-01-10 | Stop reason: HOSPADM

## 2020-01-09 RX ORDER — BUMETANIDE 0.25 MG/ML
0.5 INJECTION INTRAMUSCULAR; INTRAVENOUS ONCE
Status: COMPLETED | OUTPATIENT
Start: 2020-01-09 | End: 2020-01-09

## 2020-01-09 RX ORDER — GUAIFENESIN/PHENYLPROPANOLAMIN
EXPECTORANT ORAL
COMMUNITY
End: 2020-01-10 | Stop reason: HOSPADM

## 2020-01-09 RX ORDER — OMEGA-3/DHA/EPA/FISH OIL 910-1400MG
CAPSULE ORAL
COMMUNITY
End: 2020-01-10 | Stop reason: HOSPADM

## 2020-01-09 RX ORDER — LANOLIN ALCOHOL/MO/W.PET/CERES
100 CREAM (GRAM) TOPICAL DAILY
COMMUNITY
End: 2020-10-15

## 2020-01-09 RX ORDER — VIT C/B6/B5/MAGNESIUM/HERB 173 50-5-6-5MG
CAPSULE ORAL
COMMUNITY
End: 2020-01-10 | Stop reason: HOSPADM

## 2020-01-09 RX ADMIN — MEROPENEM 1 G: 1 INJECTION, POWDER, FOR SOLUTION INTRAVENOUS at 05:22

## 2020-01-09 RX ADMIN — MEROPENEM 1 G: 1 INJECTION, POWDER, FOR SOLUTION INTRAVENOUS at 16:48

## 2020-01-09 RX ADMIN — VANCOMYCIN HYDROCHLORIDE 125 MG: KIT at 22:12

## 2020-01-09 RX ADMIN — SODIUM CHLORIDE, PRESERVATIVE FREE 10 ML: 5 INJECTION INTRAVENOUS at 21:18

## 2020-01-09 RX ADMIN — BUMETANIDE 0.5 MG: 0.25 INJECTION INTRAMUSCULAR; INTRAVENOUS at 15:20

## 2020-01-09 RX ADMIN — SODIUM CHLORIDE, PRESERVATIVE FREE 10 ML: 5 INJECTION INTRAVENOUS at 08:52

## 2020-01-09 RX ADMIN — BUMETANIDE 0.5 MG: 0.25 INJECTION INTRAMUSCULAR; INTRAVENOUS at 08:52

## 2020-01-09 RX ADMIN — MEROPENEM 1 G: 1 INJECTION, POWDER, FOR SOLUTION INTRAVENOUS at 21:18

## 2020-01-09 NOTE — CONSULTS
HEMATOLOGY/ONCOLOGY INPATIENT CONSULT    REASON FOR CONSULT: Severe pancytopenia    Subjective   HISTORY OF PRESENT ILLNESS; I am asked to see this 74 y.o.  male, referred for severe pancytopenia.  Had a history of renal cell carcinoma with nephrectomy in 2015 and in the contralateral kidney there is a lower pole lesion in the kidney that he says is been present for years at .  He had severe voluminous and almost constant crampy abdominal pain and diarrhea starting around Thanksgiving and that persisted despite being on ciprofloxacin and Flagyl subsequently improved and now off the Flagyl for the last week.  Came in with fevers and chills and abdominal pains with a 20 pound weight loss since November.  Had sigmoid diverticulitis on CT scan yesterday.  White count total 500 with hemoglobin in the fives and platelets of 8000.  No blasts on peripheral smear reviewed by Dr. Luna.  No schistocytes or platelet clumping.  No easy bruising or bleeding.      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      Current Outpatient Medications on File Prior to Encounter   Medication Sig Dispense Refill   • CBD (cannabidiol) oral oil Take 5 mL by mouth 2 (Two) Times a Day.     • coenzyme Q10 100 MG capsule Take 100 mg by mouth Daily.     • Cranberry 125 MG tablet Take  by mouth.     • ferrous sulfate 324 (65 Fe) MG tablet delayed-release EC tablet Take 324 mg by mouth Daily With Breakfast.     • Multiple Vitamins-Minerals (CENTRUM ADULTS PO) Take  by mouth.     • Omega-3 1400 MG capsule Take  by mouth.     • Saw Palmetto 500 MG capsule Take  by mouth.     • thiamine (B-1 HIGH POTENCY) 100 MG tablet Take 100 mg by mouth Daily.     • Turmeric ([employer] TURMERIC CURCUMIN) 500 MG capsule Take  by mouth.  "    • vitamin B-12 (CYANOCOBALAMIN) 100 MCG tablet Take 50 mcg by mouth Daily.         No Known Allergies    Social History     Socioeconomic History   • Marital status:      Spouse name: Not on file   • Number of children: Not on file   • Years of education: Not on file   • Highest education level: Not on file   Tobacco Use   • Smoking status: Former Smoker   • Smokeless tobacco: Never Used   Substance and Sexual Activity   • Alcohol use: No   • Drug use: No       No family history on file.      REVIEW OF SYSTEMS:  A 14 point review of systems was performed and is negative except as noted above.    Objective   PHYSICAL EXAM:    /68   Pulse 99   Temp 98.7 °F (37.1 °C) (Oral)   Resp 20   Ht 182.9 cm (72\")   Wt 107 kg (236 lb 3.2 oz)   SpO2 99%   BMI 32.03 kg/m²     ECOG: (3) Capable of limited self-care, confined to bed or chair > 50% of waking hours  General: well appearing male in no acute distress  HEENT: sclerae anicteric, oropharynx clear  Lymphatics: no cervical, supraclavicular, inguinal, or axillary adenopathy  Neck: Supple. No thyromegaly.  Cardiovascular: regular rate and rhythm, no murmurs  Lungs: clear to auscultation bilaterally. No respiratory distress  Abdomen: soft, nontender, nondistended.  No palpable organomegaly  Extremities: no lower extremity edema, cyanosis, or clubbing  Skin: no rashes, lesions, bruising, or petechiae  Neuro: Alert and oriented x3. Moves all extremities.    Results:    Results from last 7 days   Lab Units 01/09/20  1734 01/08/20 2317 01/08/20 2126   WBC 10*3/mm3 0.48* 0.52* 0.53*   HEMOGLOBIN g/dL 7.6* 5.0* 5.0*   PLATELETS 10*3/mm3 34* 8* 8*     Results from last 7 days   Lab Units 01/09/20 1734 01/08/20 2126   SODIUM mmol/L 137 136   POTASSIUM mmol/L 3.2* 3.7   CO2 mmol/L 25.0 23.0   BUN mg/dL 27* 31*   CREATININE mg/dL 1.36* 1.43*   GLUCOSE mg/dL 102* 110*     Results from last 7 days   Lab Units 01/08/20 2126   AST (SGOT) U/L 12   ALT (SGPT) U/L 9 "   BILIRUBIN mg/dL 0.6   ALK PHOS U/L 58         Ct Abdomen Pelvis Without Contrast    Result Date: 1/8/2020  1. Sigmoid diverticulitis. No evidence of bowel obstruction or abscess. 2. Indeterminate partially calcified 2.1 cm right renal mass. Correlation with nonemergent renal ultrasound is recommended for characterization of this finding, to exclude renal neoplasm. Correlation with any previous CT scan may be helpful. 3. Surgical absence of the left kidney. 4. Degenerative change and scoliosis of the thoracolumbar spine. Signer Name: Live Mosley MD  Signed: 1/8/2020 10:57 PM  Workstation Name: [email or web address]  [facility]      Assessment    ASSESSMENT & PLAN:    1. Severe pancytopenia  2. Protracted diarrheal illness with radiographic evidence of diverticulitis and protracted exposure to Flagyl as well as Cipro  3. History of kidney cancer status post nephrectomy a few years ago no subsequent treatment  4. Remnant kidney with lower pole mass he says that urologist at  have been telling him is been present for years  5. Sigmoid diverticulitis    Discussion: This may be related to the Flagyl which while unusual does cause potentially pancytopenia.  This could also be a post viral pancytopenia in the face of viral gastroenteritis and we will check a parvovirus B19 and this could be developing aplastic crisis.  In that event I would want him transferred for further management at the [facility] transplant program.  He is obviously not a transplant candidate at this age but I would want him managed to there.  He does have a chronic history of hemoglobin in the nines to tens with white count in the l11-15,000 range and platelets in the 500,000's dating back 3 years ago and present for several years without explanation nor any obvious work-up that I can see from that.  He did not have any blood work done during the early phases of this diarrhea illness so I am not sure of the baseline  values at that junction.  He needs bone marrow biopsy we will order for the morning and send for flow cytometry, cytogenetics, and myelodysplastic FISH panel.  I will check Parvovirus B19 and a [facility] panel.  His renal function is fairly stable with a creatinine of 1.43.  Down the road we will get his wife to get images from [facility] here for us to compare relative to the kidney abnormality.  He does have evidence of diverticulitis on imaging and still having diarrhea.  Infectious diseases on board.  I will check his stool for E. coli 0157H7 verotoxin but I doubt TTP/HUS.  He is mentating albeit he does have fevers and his renal function is stable and no schistocytes to speak of on peripheral smear.  Discussed with patient and spouse for 1 hour greater than 50% spent counseling regarding this plan face-to-face      Brijesh Flood MD    1/9/2020

## 2020-01-09 NOTE — CONSULTS
INFECTIOUS DISEASE CONSULT/INITIAL HOSPITAL VISIT    Rylan Hudson  1945  [id number]    Date of Consult: 1/9/2020    Admission Date: 1/8/2020      Requesting Provider: Edwardo Wright, *  Evaluating Physician: Sundar Alfonso MD    Reason for Consultation: Neutropenia/pancytopenia    History of present illness:    Patient is a 74 y.o. male male with history of renal cell carcinoma treated with nephrectomy in 2015 has developed diarrhea and crampy abdominal pain since around Thanksgiving 2019.  Patient was thought to have enteritis was ultimately given a course of ciprofloxacin followed by metronidazole with improvement of diarrhea.  CT imaging markable for sigmoid diverticulitis.  Patient diarrhea is improving but has new onset weakness and fevers and rigors patient has 15 to 20 pounds of weight loss since November.  Because of weakness and diarrhea patient has been admitted to hospital.  Patient found to have pancytopenia on peripheral blood work.  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  Patient quit smoking does not use alcohol or illicit drugs.    Patient is  and lives locally.    CT scan from 1/8/2020 remarkable for sigmoid diverticulitis as well as partially calcified right renal mass.    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    Social History     Socioeconomic History   • Marital status:      Spouse name: Not on file   • Number of children: Not on file   • Years of education: Not on file   • Highest education level: Not on file   Tobacco Use   • Smoking status: Former Smoker   • Smokeless tobacco: Never Used   Substance and Sexual Activity   • Alcohol use: No   • Drug use: No       No Known Allergies      Medication:    Current Facility-Administered  Medications:   •  acetaminophen (TYLENOL) tablet 650 mg, 650 mg, Oral, Q4H PRN **OR** acetaminophen (TYLENOL) 160 MG/5ML solution 650 mg, 650 mg, Oral, Q4H PRN **OR** acetaminophen (TYLENOL) suppository 650 mg, 650 mg, Rectal, Q4H PRN, Debbie Gunter, PA-C  •  melatonin tablet 5 mg, 5 mg, Oral, Nightly PRN, Debbie Gunter, PA-C  •  meropenem (MERREM) 1 g/100 mL 0.9% NS VTB (mbp), 1 g, Intravenous, Q8H, Debbie Gunter PA-C, Last Rate: 33.3 mL/hr at 20 1648, 1 g at 20 1648  •  ondansetron (ZOFRAN) tablet 4 mg, 4 mg, Oral, Q6H PRN, Debbie Gunter, PA-C  •  sodium chloride 0.9 % flush 10 mL, 10 mL, Intravenous, Q12H, Debbie Gunter, PA-C, 10 mL at 20 0852  •  sodium chloride 0.9 % flush 10 mL, 10 mL, Intravenous, PRN, Debbie Gunter PA-C  •  sodium chloride 0.9 % infusion, 100 mL/hr, Intravenous, Continuous, Debbie Gunter PA-C, Last Rate: 100 mL/hr at 20 2249, 100 mL/hr at 20 2249    Antibiotics:  Anti-Infectives (From admission, onward)    Ordered     Dose/Rate Route Frequency Start Stop    20  meropenem (MERREM) 1 g/100 mL 0.9% NS VTB (mbp)  Review   Ordering Provider:  Debbie Gunter PA-C    1 g  33.3 mL/hr over 3 Hours Intravenous Every 8 Hours 20 0500 20 0459    20  meropenem (MERREM) 1 g/100 mL 0.9% NS VTB (mbp)     Ordering Provider:  Debbie Gunter PA-C    1 g  over 30 Minutes Intravenous Once 20 2230 20 0003            Review of Systems:  Pertinent positives include appetite change, chills, diaphoresis, fatigue, weight loss, diarrhea, nausea, weakness    Rest of review of systems were reviewed and were unremarkable      Physical Exam:   Vital Signs  Temp (24hrs), Av.6 °F (37 °C), Min:97.7 °F (36.5 °C), Max:99.2 °F (37.3 °C)    Temp  Min: 97.7 °F (36.5 °C)  Max: 99.2 °F (37.3 °C)  BP  Min: 109/69  Max: 145/81  Pulse  Min: 93  Max: 118  Resp  Min: 16  Max: 20  SpO2  Min: 88 %  Max: 100 %    GENERAL:  Awake and alert, no acute distress  HEENT: Normocephalic, atraumatic.  PERRL. EOMI. No conjunctival injection. No icterus. Oropharynx clear without evidence of thrush or exudate. No evidence of peridontal disease.      HEART: RRR; No murmur,  LUNGS: Clear to auscultation bilaterally   ABDOMEN: Soft, nontender, nondistended.   EXT:  No cyanosis, clubbing or edema. No cord.  :  Without Bhagat catheter.  MSK: No joint deformity  SKIN: Warm and dry without cutaneous eruptions on Inspection/palpation.    NEURO: Oriented to PPT.  PSYCHIATRIC: Normal insight and judgement. Cooperative with PE    Laboratory Data    Results from last 7 days   Lab Units 01/08/20  2317 01/08/20  2126   WBC 10*3/mm3 0.52* 0.53*   HEMOGLOBIN g/dL 5.0* 5.0*   HEMATOCRIT % 14.6* 14.3*   PLATELETS 10*3/mm3 8* 8*     Results from last 7 days   Lab Units 01/08/20  2126   SODIUM mmol/L 136   POTASSIUM mmol/L 3.7   CHLORIDE mmol/L 101   CO2 mmol/L 23.0   BUN mg/dL 31*   CREATININE mg/dL 1.43*   GLUCOSE mg/dL 110*   CALCIUM mg/dL 8.8     Results from last 7 days   Lab Units 01/08/20  2126   ALK PHOS U/L 58   BILIRUBIN mg/dL 0.6   ALT (SGPT) U/L 9   AST (SGOT) U/L 12                         Estimated Creatinine Clearance: 55.6 mL/min (A) (by C-G formula based on SCr of 1.43 mg/dL (H)).      Microbiology:  No results found for: ACANTHNAEG, AFBCX, BPERTUSSISCX, BLOODCX  No results found for: BCIDPCR, CXREFLEX, CSFCX, CULTURETIS  No results found for: CULTURES, HSVCX, URCX  No results found for: EYECULTURE, GCCX, LABHSV  No results found for: LEGIONELLA, MRSACX, MUMPSCX, MYCOPLASCX  No results found for: NOCARDIACX, STOOLCX  No results found for: THROATCX, UNSTIMCULT, URINECX, CULTURE, VZVCULTUR  No results found for: VIRALCULTU, WOUNDCX        Radiology:  Imaging Results (Last 72 Hours)     Procedure Component Value Units Date/Time    CT Abdomen Pelvis Without Contrast [[id number]] Collected:  01/08/20 2255     Updated:  01/08/20 2300    Narrative:        CT Abdomen Pelvis WO 1/8/2020    INDICATION:   Loss of appetite and lethargy for 2 months. Diverticulitis.    TECHNIQUE:   CT of the abdomen and pelvis without IV contrast. Coronal and sagittal reconstructions were obtained.  Radiation dose reduction techniques included automated exposure control or exposure modulation based on body size. Count of known CT and cardiac nuc  med studies performed in previous 12 months: 0.     COMPARISON:   None available.    FINDINGS:  Abdomen: The liver, spleen, pancreas, gallbladder and biliary tree, adrenal glands are normal. The left kidney is surgically absent. There is an indeterminate partially calcified 2.1 cm x 1.2 cm lesion on the anterior aspect of the lower pole of the  right kidney. Recommend correlation with nonemergent renal ultrasound for characterization of this finding to exclude a renal mass. Correlation with any prior exams may be helpful. Degenerative change and scoliosis of the visualized thoracolumbar spine.    Pelvis: Colonic diverticulosis is noted. There is thickening of the wall of the sigmoid colon with inflammatory stranding in the surrounding mesenteric fat characteristic of sigmoid diverticulitis. No bowel obstruction or abscess is seen. The gut is  otherwise normal. There is no significant adenopathy and there is no free fluid in the abdomen or pelvis. The lung bases are unremarkable.      Impression:         1. Sigmoid diverticulitis. No evidence of bowel obstruction or abscess.  2. Indeterminate partially calcified 2.1 cm right renal mass. Correlation with nonemergent renal ultrasound is recommended for characterization of this finding, to exclude renal neoplasm. Correlation with any previous CT scan may be helpful.  3. Surgical absence of the left kidney.  4. Degenerative change and scoliosis of the thoracolumbar spine.      Signer Name: Live Mosley MD   Signed: 1/8/2020 10:57 PM   Workstation Name: [email or web address]    [facility]             Impression:   Pancytopenia  Recent flagyl  Resolving diarrhea  Rigors  Neutropenia  ? Diverticulitis of sigmoid colon  H/o renal cell carcinoma 2015  Right renal mass          PLAN/RECOMMENDATIONS:   Thank you for asking us to see Rylan Hudson, I recommend the following:  Patient was doing well until around Thanksgiving is been given a course of ciprofloxacin followed by Flagyl and is now found to have pancytopenia.  Metronidazole can cause leukopenia and thrombocytopenia.  While this seems an unlikely explanation may be possible.  If white blood cell counts and platelet counts do not start to increase I would recommend proceeding with bone marrow biopsy to rule out malignancy.    Meropenem is reasonable for now cover for gram-positive gram-negative and anaerobic infections.  Upon worsening consider addition of vancomycin.    Emily case with family and Dr. Cristel Cope who is reviewed peripheral smear with pathology and apparently no blasts were present    Meropenem should treat diverticulitis upon worsening may add antifungal therapy.  Like to avoid metronidazole at this time.       Sundar Alfonso MD  1/9/2020  5:30 PM

## 2020-01-09 NOTE — PLAN OF CARE
Problem: Patient Care Overview  Goal: Plan of Care Review  Outcome: Ongoing (interventions implemented as appropriate)  Flowsheets  Taken 1/9/2020 0148  Progress: no change  Taken 1/8/2020 2000  Plan of Care Reviewed With: patient;spouse;daughter  Note:   Pt. Admitted with c/o increased weakness, diarrhea that has improved slightly, temp that started today. Patient is A&Ox4. Denies pain. Critical labs reported to Dr. Wright with new orders received. Pt. Will receive 2 units PRBC's and 1 unit of platelets. Vital signs stable, rhythm NS/ST at times. Pt. Placed on neutropenic precautions r/t WBC of 0.52. Spoke with Ruthie, patients spouse and she is aware of POC.

## 2020-01-09 NOTE — H&P
"    [facility] Medicine Services  HISTORY AND PHYSICAL    Patient Name: Rylan Hudson  : 1945  MRN: [id number]  Primary Care Physician: Blair Fermin MD  Date of admission: 2020      Subjective   Subjective     Chief Complaint:  diarrhea    HPI:  Rylan Hudson is a 74 y.o. male with a past medical history significant for renal cell cancer s/p left nephrectomy who presents as a transfer from PCP, Dr. Fermin with concerns of persistent diarrhea and crampy abdominal pain ongoing since Mid 2019. Presentation was initially thought to be secondary to enteritis and later there was a concern for C. Diff enteritis. Cultures and testing for C. Difficile were negative.CT imaging of abdomen demonstrated sigmoid diverticulitis.  The patient was treated with empirically with Flagyl 5 mg BID x 13 days. There some improvement in diarrhea.  Last dose was about 10 days ago. Patient returned to PCP today with reports of fever at home, T-max 101.9, generalized weakness, anorexia, and unintentional weight loss of about 15-20 pounds since November. Patient also reporting persistent diarrhea and nausea without vomiting. Patient was subsequently admitted for suspected sigmoid diverticulitis refractory to outpatient therapy. He denies associated cough, congestion, SOB, chest pain, blood in stool, or dysuria. Patient has never had a colonoscopy and refuses the procedure. He does report a \"non invasive\" colonoscopy done outpatient by PCP which was apparently negative for acute issues. Patient arrived tachycardic and febrile. Vitals otherwise stable. No additional complaints at this time.    Review of Systems   Constitutional: Positive for appetite change, chills, diaphoresis, fatigue, fever and unexpected weight change.   HENT: Negative for congestion and trouble swallowing.    Eyes: Negative for photophobia and visual disturbance.   Respiratory: Negative for cough and shortness of breath.  "   Cardiovascular: Negative for chest pain and leg swelling.   Gastrointestinal: Positive for diarrhea and nausea. Negative for abdominal pain, anal bleeding, blood in stool and vomiting.   Endocrine: Negative for cold intolerance and heat intolerance.   Genitourinary: Negative for dysuria and enuresis.   Musculoskeletal: Negative for back pain and gait problem.   Skin: Negative for pallor and rash.   Allergic/Immunologic: Negative for immunocompromised state.   Neurological: Positive for weakness. Negative for dizziness and headaches.   Hematological: Negative for adenopathy.   Psychiatric/Behavioral: Negative for agitation and confusion.        All other systems reviewed and are negative.     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 Otherwise pertinent FHx was reviewed and unremarkable.     Social History:  reports that he has quit smoking. He has never used smokeless tobacco. He reports that he does not drink alcohol or use drugs.  Social History     Social History Narrative   • Not on file       Medications:  Available home medication information reviewed.  Medications Prior to Admission   Medication Sig Dispense Refill Last Dose   • CBD (cannabidiol) oral oil Take 5 mL by mouth 2 (Two) Times a Day.   1/8/2020 at Unknown time   • vitamin B-12 (CYANOCOBALAMIN) 100 MCG tablet Take 50 mcg by mouth Daily.   1/7/2020 at Unknown time       Allergies   Allergen Reactions   • Levaquin [Levofloxacin] Other (See Comments)     Weight loss   • Penicillins Unknown - Low Severity     Family history       Objective   Objective     Vital Signs:   Temp:  [98.1 °F (36.7 °C)-101.9 °F (38.8 °C)] 98.1 °F (36.7 °C)  Heart Rate:  [112-126] 112  Resp:  [20-22] 20  BP: (120-141)/(60-78) 141/78         Physical Exam   Constitutional: Awake, alert  Eyes: PERRLA, sclerae anicteric, no conjunctival injection  HENT: NCAT, mucous membranes moist  Neck: Supple, no thyromegaly, no lymphadenopathy, trachea midline  Respiratory: Clear to auscultation bilaterally, nonlabored respirations   Cardiovascular: RRR, no murmurs, rubs, or gallops, palpable pedal pulses bilaterally  Gastrointestinal: Positive bowel sounds, soft, nontender, nondistended  Musculoskeletal: No bilateral ankle edema, no clubbing or cyanosis to extremities  Psychiatric: Appropriate affect, cooperative  Neurologic: Oriented x 3, strength symmetric in all extremities, Cranial Nerves grossly intact to confrontation, speech clear  Skin: No rashes      Results Reviewed:  I have personally reviewed current lab and radiology data.        Results from last 7 days   Lab Units 01/08/20  2126   SODIUM mmol/L 136   POTASSIUM mmol/L 3.7   CHLORIDE mmol/L 101   CO2 mmol/L 23.0   BUN mg/dL 31*   CREATININE mg/dL 1.43*   GLUCOSE mg/dL 110*   CALCIUM mg/dL 8.8   ALT (SGPT) U/L 9   AST (SGOT) U/L 12     Estimated Creatinine Clearance: 55.6 mL/min (A) (by C-G formula based on SCr of 1.43 mg/dL (H)).  Brief Urine Lab Results     None        Imaging Results (Last 24 Hours)     ** No results found for the last 24 hours. **             Assessment/Plan   Assessment & Plan     Active Hospital Problems    Diagnosis POA   • **Sepsis (CMS/HCC) [A41.9] Yes     Priority: High   • Diverticulitis [K57.92] Yes     Priority: High   • Renal cell cancer, left (CMS/HCC) [C64.2] Yes     Priority: Low   • Solitary kidney [Q60.0] Not Applicable     74 year old male with medical history significant for RCC s/p left nephrectomy presents with persistent diarrhea ongoing for 2 months plus new fever, anorexia, and weight loss. From PCP office Dr. Blair Fermin. Outpatient CT imaging done within the past week revealed sigmoid diverticulitis. Suspected to have failed outpatient treatment with  flagyl x 13 days.    1. Sepsis secondary to diverticulitis:  - tachycardic and febrile  - obtain stat labs; lactic acid, blood cultures, CMP, CBC, procalcitonin  - CT A/P without contrast ( solitary kidney, refusing oral contrast)  - saline 100cc/hr  - start Merrem. PCN allergy, failed flagyl, allergy to flouroquinolones   - consider consult to GI vs colorectal    2. Solitary Kidney:  - s/p left nephrectomy due to RCC  - monitor creatinine and avoid nephrotoxins    LE size discrepancy  - RLE duplex to r/o DVT    I got called with a critical lab values.  Patient is pancytopenic and has neutropenic fever, etiology unclear at this time.  WBC is 0.53--lymphocyte predominant, hemoglobin of 5, platelet of 8.  Absolute neutrophil count is 0.04.  Patient was recently treated for presumed C. difficile colitis by his PCP with 13 days of Flagyl.  I have ordered for repeat CBC just to make sure that this is not an error.  Patient denies any hematochezia, melena, or any other sites of bleeding.  He does not have any cough, dysuria, or any further diarrhea.  He also does not have any abdominal pain at this time.  Not sure if this is bone marrow suppression from recent infection/antibiotics use versus myelodysplasia.  Patient does report about 20 pounds of unintentional weight loss over the last 2 months, but does report that his appetite had been very poor over the last 2 months.  At this time, patient appears chronically ill, but does not appear toxic.  Given history of possible C. difficile, I will Merrem empirically and consult ID for some assistance in the morning.  May need to consult hematology as well, but will defer to daina WRIGHT in the morning.  We will put patient on neutropenic precaution.  Will follow up on repeat labs, if this is not an error, will transfuse 2 units of PRBC for symptomatic anemia, and 1 unit of platelet.      Addendum: Notified that labs are correct, so will transfuse tonight and recheck labs in am.  "CT A/P showed partially calcified renal mass and evidence of diverticulitis with abscess or perforation. Already on Merrem.    DVT prophylaxis: Mechanical    CODE STATUS:  Full code  Code Status and Medical Interventions:   Ordered at: 01/08/20 2102     Level Of Support Discussed With:    Patient     Code Status:    CPR     Medical Interventions (Level of Support Prior to Arrest):    Full       Admission Status:  I believe this patient meets INPATIENT status due to IV ABX.  I feel patient’s risk for adverse outcomes and need for care warrant INPATIENT evaluation and I predict the patient’s care encounter to likely last beyond 2 midnights.      Electronically signed by Debbie Gunter PA-C, 01/08/20, 9:04 PM.    .  Attending   Admission Attestation       I have seen and examined the patient, performing an independent face-to-face diagnostic evaluation with plan of care reviewed and developed with the advanced practice clinician (APC).      Brief Summary Statement:   Rylan Hudson is a 74 y.o. male with past medical history significant for renal cell carcinoma-status post left nephrectomy and did not require any further chemotherapy, who had been doing relatively well and in his usual state of good health until around Thanksgiving time where he started having diarrhea that progressively got worse.  He followed up with his PCP in early December, at that time he was started on a course of Cipro.  At that time PCP reported that he ordered stool studies, which came back as unremarkable.  He also ordered C. difficile, however stool sample was \"left out too long \"and not testable.  After course of Cipro, patient diarrhea did not improve, his PCP decided to start him on Flagyl empirically for what he thought might be C. difficile.  Patient completed a 13-day course without feeling any better, complaining of progressive weakness and weight loss of about 20 pounds over last 2 months, as well as ongoing diarrhea.  At that " time, CT scan of the abdomen pelvis was performed, as was rechecking stool for C. difficile, which was negative.  CT scan showed bowel wall thickening, consistent with colitis.  When patient presented for follow-up today, he reported fever, 99 at home, however at PCPs office, temperature was 100.6.  Patient was directed for admission for further evaluation of his fever.  Of note, patient did report that his diarrhea improved, and stool is now loosely formed.  He denies any blood or mucus in the stool.  Denies any melena.     Remainder of detailed HPI is as noted by APC and has been reviewed and/or edited by me for completeness.    Attending Physical Exam:    General Assessment: No acute cardiopulmonary distress. Well developed and well nourished.    HEENT: NCAT, PERRL, MM dry    Neck: Supple    CVS: RRR, S1S2 normal, no murmurs    Resp: CTAB, no adventitious sound    Abd: soft, NT, ND, normal BS, no guarding or peritoneal signs    Ext: + chronic bilat LE edema, right calf larger than left, which is new    Neuro: Nonfocal    Skin: W/D/I. No rash.    Psych: Affect is appropriate      Brief Assessment/Plan :  See detailed assessment and plan developed with APC which I have reviewed and/or edited for completeness.    Electronically signed by Edwardo Wright MD, 01/08/20, 11:53 PM.

## 2020-01-09 NOTE — PROGRESS NOTES
Discharge Planning Assessment  [facility]     Patient Name: Rylan Hudson  MRN: [id number]  Today's Date: 1/9/2020    Admit Date: 1/8/2020    Discharge Needs Assessment     Row Name 01/09/20 1319       Living Environment    Lives With  spouse    Name(s) of Who Lives With Patient  Ruthie Hudson (spouse) [phone]    Current Living Arrangements  home/apartment/condo    Primary Care Provided by  self    Provides Primary Care For  no one    Family Caregiver if Needed  spouse    Family Caregiver Names  Ruthie Hudson (spouse) [phone]    Quality of Family Relationships  helpful;involved;supportive    Able to Return to Prior Arrangements  yes       Resource/Environmental Concerns    Resource/Environmental Concerns  home accessibility    Home Accessibility Concerns  other (see comments) Patient has basement stairs, but states that he rarely uses them.  Bedroom and bathroom on first level       Transition Planning    Patient/Family Anticipates Transition to  home with family    Patient/Family Anticipated Services at Transition      Transportation Anticipated  family or friend will provide       Discharge Needs Assessment    Readmission Within the Last 30 Days  no previous admission in last 30 days    Concerns to be Addressed  discharge planning    Equipment Currently Used at Home  wheelchair;rollator;shower chair;commode    Anticipated Changes Related to Illness  none    Equipment Needed After Discharge  none    Provided post acute provider list?  Yes    Post Acute Provider Lists  DME Supplier;Home Health    Post Acuite Provider List  Delivered    Delivered To  Patient    Method of Delivery  In person    Patient's Choice of Community Agency(s)  Declines services at this time        Discharge Plan     Row Name 01/09/20 1322       Plan    Plan  Home with family    Patient/Family in Agreement with Plan  yes    Plan Comments  Spoke with patient at bedside.  Patient states that he lives with his wife in  [place]  States that he is independent with ADLS at home.  Has wheelchair and rollator for PRN use as well as a shower chair, bedside commode and BP cuff.   Patient states that he is not current with home health or other services.  States that his PCP is Blair Fermin MD and insurance provider is [employer] [facility].  Patient states that he has prescription drug coverage and that he is able to afford medications.  Plans to discharge home with family when medically ready.  Children will transport at discharge.  CM will continue to follow for discharge planning.     Final Discharge Disposition Code  01 - home or self-care        Destination      Coordination has not been started for this encounter.      Durable Medical Equipment      Coordination has not been started for this encounter.      Dialysis/Infusion      Coordination has not been started for this encounter.      Home Medical Care      Coordination has not been started for this encounter.      Therapy      Coordination has not been started for this encounter.      Community Resources      Coordination has not been started for this encounter.        Expected Discharge Date and Time     Expected Discharge Date Expected Discharge Time    Jan 13, 2020         Demographic Summary     Row Name 01/09/20 1318       General Information    Admission Type  inpatient    Arrived From  home    Referral Source  admission list    Reason for Consult  discharge planning    Preferred Language  English     Used During This Interaction  no    General Information Comments  PCP:  Blair Fermin MD confirmed with patient        Functional Status     Row Name 01/09/20 1319       Functional Status    Usual Activity Tolerance  good    Current Activity Tolerance  good       Functional Status, IADL    Medications  independent    Meal Preparation  independent    Housekeeping  independent    Laundry  independent    Shopping  independent        Psychosocial    No  documentation.       Abuse/Neglect    No documentation.       Legal    No documentation.       Substance Abuse    No documentation.       Patient Forms    No documentation.           Gisselle Olea RN

## 2020-01-09 NOTE — PLAN OF CARE
Pt received 2 units PRBC with one unit of platelets.  Tolerated well.  Consults for Infectious disease and Hem Oc ordered per Dr Cope.  Pt had one liquid stool today.

## 2020-01-09 NOTE — PROGRESS NOTES
[facility] Medicine Services  PROGRESS NOTE    Patient Name: Rylan Hudson  : 1945  MRN: [id number]    Date of Admission: 2020  Primary Care Physician: Blair Fermin MD    Subjective   Subjective     CC:  2 months of diarrhea, now with outpt CT suggesting sigmoid diverticulitis    HPI:  Feels more energetic after 2u pRBC.  Hungry.    He has been fatigued w poor appetite in recent weeks; no bleeding; no blood in stool; having intermittent chills but no known fever.    Says his diarrhea improved in late Dec after flagyl x 13 days and he gave a CDiff sample after tretament that was negative.  He did have a watery stool today in hospital.     We discussed possibility of leukemia.  He was stoic.     Review of Systems   Chills at home.    No chest pain  GOLDSTEIN recently  GI as above.  No nausea, currently hungry      Objective   Objective     Vital Signs:   Temp:  [97.7 °F (36.5 °C)-101.9 °F (38.8 °C)] 98.8 °F (37.1 °C)  Heart Rate:  [] 98  Resp:  [16-22] 20  BP: (109-145)/(60-96) 145/81        Physical Exam:  Gen:  WD/WN man in NAD in bed.  Wife present  Neuro: alert and oriented, clear speech, follows commands, grossly nonfocal  HEENT:  NC/AT PERRL, OP benign  Neck:  Supple, no LAD  Heart RRR no murmur, rub, or gallop  Lungs anterior exam nonlabored and clear.  Abd:  Soft, min tender in lower quadrants, no rebound or guarding, pos BS  Extrem:  No c/c, mild edema at ankles  Skin - no petechaie noted at ankles      Results Reviewed:    Results from last 7 days   Lab Units 20  2317 20   WBC 10*3/mm3 0.52* 0.53*   HEMOGLOBIN g/dL 5.0* 5.0*   HEMATOCRIT % 14.6* 14.3*   PLATELETS 10*3/mm3 8* 8*   PROCALCITONIN ng/mL  --  0.34*     Results from last 7 days   Lab Units 20   SODIUM mmol/L 136   POTASSIUM mmol/L 3.7   CHLORIDE mmol/L 101   CO2 mmol/L 23.0   BUN mg/dL 31*   CREATININE mg/dL 1.43*   GLUCOSE mg/dL 110*   CALCIUM mg/dL 8.8   ALT (SGPT) U/L  9   AST (SGOT) U/L 12     Estimated Creatinine Clearance: 55.6 mL/min (A) (by C-G formula based on SCr of 1.43 mg/dL (H)).    Microbiology Results Abnormal     None          Imaging Results (Last 24 Hours)     Procedure Component Value Units Date/Time    CT Abdomen Pelvis Without Contrast [[id number]] Collected:  01/08/20 2257     Updated:  01/08/20 2300    Narrative:       CT Abdomen Pelvis WO 1/8/2020    INDICATION:   Loss of appetite and lethargy for 2 months. Diverticulitis.    TECHNIQUE:   CT of the abdomen and pelvis without IV contrast. Coronal and sagittal reconstructions were obtained.  Radiation dose reduction techniques included automated exposure control or exposure modulation based on body size. Count of known CT and cardiac nuc  med studies performed in previous 12 months: 0.     COMPARISON:   None available.    FINDINGS:  Abdomen: The liver, spleen, pancreas, gallbladder and biliary tree, adrenal glands are normal. The left kidney is surgically absent. There is an indeterminate partially calcified 2.1 cm x 1.2 cm lesion on the anterior aspect of the lower pole of the  right kidney. Recommend correlation with nonemergent renal ultrasound for characterization of this finding to exclude a renal mass. Correlation with any prior exams may be helpful. Degenerative change and scoliosis of the visualized thoracolumbar spine.    Pelvis: Colonic diverticulosis is noted. There is thickening of the wall of the sigmoid colon with inflammatory stranding in the surrounding mesenteric fat characteristic of sigmoid diverticulitis. No bowel obstruction or abscess is seen. The gut is  otherwise normal. There is no significant adenopathy and there is no free fluid in the abdomen or pelvis. The lung bases are unremarkable.      Impression:         1. Sigmoid diverticulitis. No evidence of bowel obstruction or abscess.  2. Indeterminate partially calcified 2.1 cm right renal mass. Correlation with nonemergent renal  ultrasound is recommended for characterization of this finding, to exclude renal neoplasm. Correlation with any previous CT scan may be helpful.  3. Surgical absence of the left kidney.  4. Degenerative change and scoliosis of the thoracolumbar spine.      Signer Name: Live Mosley MD   Signed: 1/8/2020 10:57 PM   Workstation Name: [id number]    [facility]               I have reviewed the medications:  Scheduled Meds:  meropenem 1 g Intravenous Q8H   sodium chloride 10 mL Intravenous Q12H     Continuous Infusions:  sodium chloride 100 mL/hr Last Rate: 100 mL/hr (01/08/20 2249)     PRN Meds:.•  acetaminophen **OR** acetaminophen **OR** acetaminophen  •  melatonin  •  ondansetron  •  sodium chloride      Assessment/Plan   Assessment & Plan     Active Hospital Problems    Diagnosis  POA   • **Sepsis (CMS/HCC) [A41.9]  Yes   • Pancytopenia (CMS/HCC) [D61.818]  Unknown   • Anemia [D64.9]  Unknown   • Thrombocytopenia (CMS/HCC) [D69.6]  Unknown   • Diverticulitis [K57.92]  Yes   • Solitary kidney [Q60.0]  Not Applicable   • Fever [R50.9]  Yes   • Renal cell cancer, left (CMS/HCC) [C64.2]  Yes      Resolved Hospital Problems   No resolved problems to display.        Brief Hospital Course to date:  Rylan Hudson is a 74 y.o. male with history of RCC s/p left nephrectomy (no chemo).  Sent by PCP for 2 months of diarrhea and now  plus new fever, anorexia, and weight loss. Took cipro in early Dec.  Took flagyl x 13 days thereafter - epmpirc towards possible CDiff though sample was not testable for technical reasons.  20 lbs weight loss in this time.  CT scan showed bowel wall thickeningin sigmoid.  Temp 100.6 in PCP office.     Pancytopenia, unexplained.  - discussed with pathologist and hematologist  - periph smear reviewed;  No blasts seen.  Heme to see.  Consider BMBx.  Patient has no known hematologic conditions prior to this.   - got 2u PRBC and one unit plats today.  Feels more  energetic.    Persistent diarrhea, weight loss and rigors x 2 months  CT showing sigmoid diverticulitis:  - tachycardic and febrile  - started Merrem.      - given low counts, surgery and colonoscopy are poor options - treat conservatively while working up heme condition  - ID appreciated  - distant possibility that either cipro or flagyl caused hematopoeitc failure.       ckd  Solitary Kidney:  - s/p left nephrectomy due to RCC  - monitor creatinine and avoid nephrotoxins     LE size discrepancy  - RLE duplex to r/o DVT      DVT Prophylaxis:  NONE due to low plts.  Concern that SCDs will cause bruising and swelling.  Encourage ambulation.     Disposition: I expect the patient to be discharged tbd.    CODE STATUS:   Code Status and Medical Interventions:   Ordered at: 01/08/20 2102     Level Of Support Discussed With:    Patient     Code Status:    CPR     Medical Interventions (Level of Support Prior to Arrest):    Full         Electronically signed by Cristel Cope MD, 01/09/20, 3:51 PM.

## 2020-01-10 VITALS
SYSTOLIC BLOOD PRESSURE: 119 MMHG | DIASTOLIC BLOOD PRESSURE: 75 MMHG | BODY MASS INDEX: 31.99 KG/M2 | TEMPERATURE: 98.6 F | OXYGEN SATURATION: 96 % | WEIGHT: 236.2 LBS | HEIGHT: 72 IN | HEART RATE: 100 BPM | RESPIRATION RATE: 18 BRPM

## 2020-01-10 LAB
ABO + RH BLD: NORMAL
ADV 40+41 DNA STL QL NAA+NON-PROBE: NOT DETECTED
APTT PPP: 41.6 SECONDS (ref 24–37)
ASTRO TYP 1-8 RNA STL QL NAA+NON-PROBE: NOT DETECTED
BASOPHILS # BLD MANUAL: 0 10*3/MM3 (ref 0–0.2)
BASOPHILS NFR BLD AUTO: 0 % (ref 0–1.5)
BH BB BLOOD EXPIRATION DATE: NORMAL
BH BB BLOOD TYPE BARCODE: 6200
BH BB DISPENSE STATUS: NORMAL
BH BB PRODUCT CODE: NORMAL
BH BB UNIT NUMBER: NORMAL
C CAYETANENSIS DNA STL QL NAA+NON-PROBE: NOT DETECTED
CAMPY SP DNA.DIARRHEA STL QL NAA+PROBE: NOT DETECTED
CRYPTOSP STL CULT: NOT DETECTED
CYTOLOGIST CVX/VAG CYTO: NORMAL
D DIMER PPP FEU-MCNC: >20 MCGFEU/ML (ref 0–0.56)
DEPRECATED RDW RBC AUTO: 55.9 FL (ref 37–54)
E COLI DNA SPEC QL NAA+PROBE: NOT DETECTED
E HISTOLYT AG STL-ACNC: NOT DETECTED
EAEC PAA PLAS AGGR+AATA ST NAA+NON-PRB: NOT DETECTED
EC STX1 + STX2 GENES STL NAA+PROBE: NOT DETECTED
EOSINOPHIL # BLD MANUAL: 0.01 10*3/MM3 (ref 0–0.4)
EOSINOPHIL NFR BLD MANUAL: 1 % (ref 0.3–6.2)
EPEC EAE GENE STL QL NAA+NON-PROBE: NOT DETECTED
ERYTHROCYTE [DISTWIDTH] IN BLOOD BY AUTOMATED COUNT: 17 % (ref 12.3–15.4)
ETEC LTA+ST1A+ST1B TOX ST NAA+NON-PROBE: NOT DETECTED
FIBRINOGEN PPP-MCNC: 366 MG/DL (ref 220–400)
FSP PPP LA-ACNC: ABNORMAL
G LAMBLIA DNA SPEC QL NAA+PROBE: NOT DETECTED
HCT VFR BLD AUTO: 19.5 % (ref 37.5–51)
HGB BLD-MCNC: 6.9 G/DL (ref 13–17.7)
INR PPP: 1.18 (ref 0.85–1.16)
LYMPHOCYTES # BLD MANUAL: 0.49 10*3/MM3 (ref 0.7–3.1)
LYMPHOCYTES NFR BLD MANUAL: 1 % (ref 5–12)
LYMPHOCYTES NFR BLD MANUAL: 77 % (ref 19.6–45.3)
MACROCYTES BLD QL SMEAR: ABNORMAL
MCH RBC QN AUTO: 33.5 PG (ref 26.6–33)
MCHC RBC AUTO-ENTMCNC: 35.4 G/DL (ref 31.5–35.7)
MCV RBC AUTO: 94.7 FL (ref 79–97)
METAMYELOCYTES NFR BLD MANUAL: 6 % (ref 0–0)
MONOCYTES # BLD AUTO: 0.01 10*3/MM3 (ref 0.1–0.9)
NEUTROPHILS # BLD AUTO: 0.06 10*3/MM3 (ref 1.7–7)
NEUTROPHILS NFR BLD MANUAL: 8 % (ref 42.7–76)
NEUTS BAND NFR BLD MANUAL: 1 % (ref 0–5)
NOROVIRUS GI+II RNA STL QL NAA+NON-PROBE: NOT DETECTED
NRBC SPEC MANUAL: 2 /100 WBC (ref 0–0.2)
P SHIGELLOIDES DNA STL QL NAA+PROBE: NOT DETECTED
PATH INTERP BLD-IMP: NORMAL
PLAT MORPH BLD: NORMAL
PLATELET # BLD AUTO: 18 10*3/MM3 (ref 140–450)
PMV BLD AUTO: 12.2 FL (ref 6–12)
POLYCHROMASIA BLD QL SMEAR: ABNORMAL
PROMYELOCYTES NFR BLD MANUAL: 6 % (ref 0–0)
PROTHROMBIN TIME: 14.4 SECONDS (ref 11.2–14.3)
RBC # BLD AUTO: 2.06 10*6/MM3 (ref 4.14–5.8)
RV RNA STL NAA+PROBE: NOT DETECTED
SALMONELLA DNA SPEC QL NAA+PROBE: NOT DETECTED
SAPO I+II+IV+V RNA STL QL NAA+NON-PROBE: NOT DETECTED
SHIGELLA SP+EIEC IPAH STL QL NAA+PROBE: NOT DETECTED
UNIT  ABO: NORMAL
UNIT  RH: NORMAL
V CHOLERAE DNA SPEC QL NAA+PROBE: NOT DETECTED
VIBRIO DNA SPEC NAA+PROBE: NOT DETECTED
WBC MORPH BLD: NORMAL
WBC NRBC COR # BLD: 0.63 10*3/MM3 (ref 3.4–10.8)
YERSINIA STL CULT: NOT DETECTED

## 2020-01-10 PROCEDURE — 36430 TRANSFUSION BLD/BLD COMPNT: CPT

## 2020-01-10 PROCEDURE — 99239 HOSP IP/OBS DSCHRG MGMT >30: CPT | Performed by: INTERNAL MEDICINE

## 2020-01-10 PROCEDURE — 85097 BONE MARROW INTERPRETATION: CPT | Performed by: INTERNAL MEDICINE

## 2020-01-10 PROCEDURE — 88311 DECALCIFY TISSUE: CPT | Performed by: INTERNAL MEDICINE

## 2020-01-10 PROCEDURE — 85379 FIBRIN DEGRADATION QUANT: CPT | Performed by: INTERNAL MEDICINE

## 2020-01-10 PROCEDURE — 85060 BLOOD SMEAR INTERPRETATION: CPT | Performed by: INTERNAL MEDICINE

## 2020-01-10 PROCEDURE — 85362 FIBRIN DEGRADATION PRODUCTS: CPT | Performed by: INTERNAL MEDICINE

## 2020-01-10 PROCEDURE — 85007 BL SMEAR W/DIFF WBC COUNT: CPT | Performed by: INTERNAL MEDICINE

## 2020-01-10 PROCEDURE — 88305 TISSUE EXAM BY PATHOLOGIST: CPT | Performed by: INTERNAL MEDICINE

## 2020-01-10 PROCEDURE — 85384 FIBRINOGEN ACTIVITY: CPT | Performed by: INTERNAL MEDICINE

## 2020-01-10 PROCEDURE — 07DR3ZX EXTRACTION OF ILIAC BONE MARROW, PERCUTANEOUS APPROACH, DIAGNOSTIC: ICD-10-PCS | Performed by: INTERNAL MEDICINE

## 2020-01-10 PROCEDURE — 85025 COMPLETE CBC W/AUTO DIFF WBC: CPT | Performed by: INTERNAL MEDICINE

## 2020-01-10 PROCEDURE — P9037 PLATE PHERES LEUKOREDU IRRAD: HCPCS

## 2020-01-10 PROCEDURE — 99233 SBSQ HOSP IP/OBS HIGH 50: CPT | Performed by: INTERNAL MEDICINE

## 2020-01-10 PROCEDURE — 86747 PARVOVIRUS ANTIBODY: CPT | Performed by: INTERNAL MEDICINE

## 2020-01-10 PROCEDURE — 85610 PROTHROMBIN TIME: CPT | Performed by: INTERNAL MEDICINE

## 2020-01-10 PROCEDURE — 85730 THROMBOPLASTIN TIME PARTIAL: CPT | Performed by: INTERNAL MEDICINE

## 2020-01-10 PROCEDURE — 25010000002 MEROPENEM PER 100 MG: Performed by: PHYSICIAN ASSISTANT

## 2020-01-10 PROCEDURE — 88313 SPECIAL STAINS GROUP 2: CPT | Performed by: INTERNAL MEDICINE

## 2020-01-10 RX ORDER — CHOLECALCIFEROL (VITAMIN D3) 125 MCG
5 CAPSULE ORAL NIGHTLY PRN
Start: 2020-01-10

## 2020-01-10 RX ADMIN — VANCOMYCIN HYDROCHLORIDE 125 MG: KIT at 13:06

## 2020-01-10 RX ADMIN — MEROPENEM 1 G: 1 INJECTION, POWDER, FOR SOLUTION INTRAVENOUS at 05:11

## 2020-01-10 RX ADMIN — VANCOMYCIN HYDROCHLORIDE 125 MG: KIT at 05:10

## 2020-01-10 RX ADMIN — ACETAMINOPHEN 650 MG: 325 TABLET ORAL at 08:33

## 2020-01-10 NOTE — PROGRESS NOTES
Case Management Discharge Note      Final Note: Patient transferring urgently to  per Dr. Flood.  Spoke with Oscar in ACC at [facility].  He states that he spoke with nursing at [facility] and has provided number for report and bed location.  Verified with maria isabel's nurse.  She has called report to unit at . [facility] transportation scheduled for patient to transfer to facility.  [facility] will arrive at 1305 (approx. 5 minutes from call).  [facility]CS placed on patient's chartlet at nurses station for crew.      Provided post acute provider list?: Yes  Post Acute Provider Lists: DME Supplier, Home Health  Post Acuite Provider List: Delivered  Delivered To: Patient  Method of Delivery: In person    Destination - Selection Complete      Service Provider Request Status Selected Services Address Phone Number Fax Number    [facility] [address] [place] [phone] -- --      Durable Medical Equipment      No service has been selected for the patient.      Dialysis/Infusion      No service has been selected for the patient.      Home Medical Care      No service has been selected for the patient.      Therapy      No service has been selected for the patient.      Community Resources      No service has been selected for the patient.        Transportation Services  Ambulance: [facility] will arrive at 1305 (approx. 5 minutes from call).  [facility] P[facility] placed on patient's chartlet at nurses station for crew)    Final Discharge Disposition Code: 02 - short term hospital for [facility]

## 2020-01-10 NOTE — NURSING NOTE
Dr. Flood alerted this nurse of urgent transfer to [facility]. All paperwork including disc of patients scans during this hospital stay have been sent with patient. Vital signs taken and patient in stable condition. Report given to Vesta LEE, at [facility]. Dr. Flood, Dr. Cope,  and charge nurse all aware of patients transfer.

## 2020-01-10 NOTE — PROGRESS NOTES
HEMATOLOGY/ONCOLOGY PROGRESS NOTE    Subjective      CC: Pancytopenia    SUBJECTIVE: Other than for diarrhea, he feels fairly fit.  Had temperature of 100.3 this morning.  Temperature now normal with normal vital signs and has no specific complaints.  No easy bruising or petechiae.  No blood in his stool or urine that he is noted.        Past Medical History, Past Surgical History, Social History, Family History have been reviewed and are without significant changes except as mentioned.      Medications:  The current medication list was reviewed in the EMR    ALLERGIES: No Known Allergies    ROS:  A comprehensive 14 point review of systems was performed and was negative except as mentioned.      Objective      Vitals:    01/10/20 0726 01/10/20 1028 01/10/20 1037 01/10/20 1044   BP: 128/70 125/83 124/74 119/75   BP Location: Left arm      Patient Position: Lying      Pulse: 97 98 101 100   Resp: 18 18 18 18   Temp: 100.3 °F (37.9 °C) 98.2 °F (36.8 °C) 98.4 °F (36.9 °C) 98.6 °F (37 °C)   TempSrc: Oral  Oral Oral   SpO2: 98% 96% 96% 96%   Weight:       Height:            ECOG: (1) Restricted in physically strenuous activity, ambulatory and able to do work of light nature    General: well appearing, in no acute distress  HEENT: sclerae anicteric, oropharynx clear  Lymphatics: no cervical, supraclavicular, inguinal, or axillary adenopathy  Cardiovascular: regular rate and rhythm, no murmurs  Lungs: clear to auscultation bilaterally  Abdomen: soft, nontender, nondistended.  No palpable organomegaly  Extremities: no lower extremity edema  Skin: no rashes, lesions, bruising, or petechiae  Neuro: Alert and oriented x 3. Moves all extremities.    RECENT LABS:    Results from last 7 days   Lab Units 01/10/20  0609 01/09/20  1734 01/08/20  2317   WBC 10*3/mm3 0.63* 0.48* 0.52*   HEMOGLOBIN g/dL 6.9* 7.6* 5.0*   PLATELETS 10*3/mm3 18* 34* 8*     Results from last 7 days   Lab Units 01/09/20  1734 01/08/20  2126   SODIUM mmol/L  137 136   POTASSIUM mmol/L 3.2* 3.7   CO2 mmol/L 25.0 23.0   BUN mg/dL 27* 31*   CREATININE mg/dL 1.36* 1.43*   GLUCOSE mg/dL 102* 110*     Results from last 7 days   Lab Units 01/08/20  2126   AST (SGOT) U/L 12   ALT (SGPT) U/L 9   BILIRUBIN mg/dL 0.6   ALK PHOS U/L 58         Ct Abdomen Pelvis Without Contrast    Result Date: 1/8/2020  1. Sigmoid diverticulitis. No evidence of bowel obstruction or abscess. 2. Indeterminate partially calcified 2.1 cm right renal mass. Correlation with nonemergent renal ultrasound is recommended for characterization of this finding, to exclude renal neoplasm. Correlation with any previous CT scan may be helpful. 3. Surgical absence of the left kidney. 4. Degenerative change and scoliosis of the thoracolumbar spine. Signer Name: Live Mosley MD  Signed: 1/8/2020 10:57 PM  Workstation Name: [id number]  [facility]              Assessment   ASSESSMENT & PLAN:  1. 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 Luna looked at the peripheral smear last evening which was fairly hypocellular but this morning apparently buffy coat was done that was suspicious for promyelocytes and on bone marrow biopsy this morning the initial testing raises further suspicion for acute promyelocytic leukemia with pancytopenia.  We do not have all-trans retinoic acid available.  I was notified of this at approximately 1150 and by 1250 we have a bed at  arranged and transportation coming to move him emergently to start all-trans retinoic acid while waiting for final results on the bone marrow biopsy.  DIC panel is pending.  Additional testing done this morning prior to these results include a pending [facility] panel as well as a parvo B19.  His platelets went up into the 30,000's from 8000 post transfusion but back down to 18,000 this morning.  Hemoglobin in the sixes.  White count total 500 with nucleated red cells, 8% neutrophils, 6% promyelocytes, 6% metamyelocytes, and 77% lymphocytes on  smear today.  D-dimer, fibrinogen, fibrin split products, PT, and PTT ordered this morning.  He is hemodynamically and hemostatically stable.  2. C. difficile colitis: Results of last evening's stool PCR was negative for viral or other and enteric pathogens other than the C. difficile toxin was positive.  This was communicated to the attending on the acute leukemia service at the [facility].  3. History of nephrectomy at  for renal cell carcinoma with persistent mass lower pole of remnant kidney reportedly present on previous imaging at  results of which are not available to me    I have spent the last hour completely absorbed in getting him quickly and efficiently transferred to the [facility] to get all-trans retinoic acid started even while we are trying to finalize the diagnosis.  Additional treatment as to arsenic trioxide or other measures I defer to the acute leukemia service at the [facility].  I have also communicated with Dr. Cope.                Brijesh Flood MD    1/10/2020

## 2020-01-10 NOTE — SIGNIFICANT NOTE
Called per nursing secondary to positive c-diff. ID already following, will start oral vancomycin.

## 2020-01-10 NOTE — DISCHARGE SUMMARY
[facility] Medicine Services  DISCHARGE SUMMARY    Patient Name: Rylan Hudson  : 1945  MRN: [id number]    Date of Admission: 2020  7:49 PM  Date of Discharge:  1/10/2019  Primary Care Physician: Blair Fermin MD    Consults     Date and Time Order Name Status Description    2020 1602 Inpatient Hematology & Oncology Consult Completed     2020 0030 Inpatient Infectious Diseases Consult Completed           Hospital Course     Presenting Problem:   Fever [R50.9]    Active Hospital Problems    Diagnosis  POA   • **Sepsis (CMS/HCC) [A41.9]  Yes   • Pancytopenia (CMS/HCC) [D61.818]  Unknown   • Anemia [D64.9]  Unknown   • Thrombocytopenia (CMS/HCC) [D69.6]  Unknown   • Diverticulitis [K57.92]  Yes   • Solitary kidney [Q60.0]  Not Applicable   • Fever [R50.9]  Yes   • Renal cell cancer, left (CMS/HCC) [C64.2]  Yes      Resolved Hospital Problems   No resolved problems to display.          Hospital Course:  Rylan Hudson is a 74 y.o. male referred by PCP for admission after 2 months of diarrhea at home.   He first got Cipro outpatient which did not help, then took 2 weeks of empiric flagyl (stool specimen was mishandled and CDiff testing never took place) which temporarily improved his symptoms.  However, he remained weak.  An outpt CT scan of the abd showed apparent sigmoid diverticulitis and he was sent for admission.     Merrem was started.  However, CBC surprisingly returned with severe pancytopenia.  Stool sample then returned positive for CDiff.  Merrem was stopped and oral vanc started.       He was transfused 2u prbc on  and 1u platelets on  followed by a second unit on 1/10.  The pancytopenia was worked up with peripheral smear (no blasts seen) and then bone marrow BX on the morning of 1/10.  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  Dr. Brijesh Flood, oncologist, has arranged emergent transfer to [facility] for care.      Discharge Follow Up  Recommendations for labs/diagnostics:   *defer to [facility].  Flow cytometry and complete BM analysis pending at time of discharge    Day of Discharge     HPI:   Feels okay, had diarrhea last night.      Review of Systems   Feels stronger since PRBC transfusion  No bleeding  Some soreness at BM biopsy site.     Otherwise ROS is negative except as mentioned in the HPI.    Vital Signs:   Temp:  [97.7 °F (36.5 °C)-100.3 °F (37.9 °C)] 98.6 °F (37 °C)  Heart Rate:  [] 100  Resp:  [18-20] 18  BP: (109-145)/(64-96) 119/75     Physical Exam:  Gen:  WD/WN man in NAD in bed, family around him.  Neuro: alert and oriented, clear speech, follows commands, grossly nonfocal  HEENT:  NC/AT PERRL, OP benign  Neck:  Supple, no LAD  Heart RRR no murmur, rub, or gallop  Lungs CTA nonlabored at rest  Abd:  Soft, nontender, no rebound or guarding, pos BS  Extrem:  No c/c/e      Pertinent  and/or Most Recent Results     Results from last 7 days   Lab Units 01/10/20  0609 01/09/20  1734 01/08/20  2317 01/08/20  2126   WBC 10*3/mm3 0.63* 0.48* 0.52* 0.53*   HEMOGLOBIN g/dL 6.9* 7.6* 5.0* 5.0*   HEMATOCRIT % 19.5* 23.3* 14.6* 14.3*   PLATELETS 10*3/mm3 18* 34* 8* 8*   SODIUM mmol/L  --  137  --  136   POTASSIUM mmol/L  --  3.2*  --  3.7   CHLORIDE mmol/L  --  98  --  101   CO2 mmol/L  --  25.0  --  23.0   BUN mg/dL  --  27*  --  31*   CREATININE mg/dL  --  1.36*  --  1.43*   GLUCOSE mg/dL  --  102*  --  110*   CALCIUM mg/dL  --  9.0  --  8.8     Results from last 7 days   Lab Units 01/08/20  2126   BILIRUBIN mg/dL 0.6   ALK PHOS U/L 58   ALT (SGPT) U/L 9   AST (SGOT) U/L 12           Invalid input(s): TG, LDLCALC, LDLREALC  Results from last 7 days   Lab Units 01/09/20  1734 01/08/20  2126   PROCALCITONIN ng/mL  --  0.34*   LACTATE mmol/L 1.1  --        Brief Urine Lab Results     None          Microbiology Results Abnormal     Procedure Component Value - Date/Time    Gastrointestinal Panel, PCR - Stool, Per Rectum [[id number]]  (Normal)  Collected:  01/09/20 1931    Lab Status:  Final result Specimen:  Stool from Per Rectum Updated:  01/10/20 0015     Campylobacter Not Detected     Plesiomonas shigelloides Not Detected     Salmonella Not Detected     Vibrio Not Detected     Vibrio cholerae Not Detected     Yersinia enterocolitica Not Detected     Enteroaggregative E. coli (EAEC) Not Detected     Enteropathogenic E. coli (EPEC) Not Detected     Enterotoxigenic E. coli (ETEC) lt/st Not Detected     Shiga-like toxin-producing E. coli (STEC) stx1/stx2 Not Detected     E. coli O157 Not Detected     Shigella/Enteroinvasive E. coli (EIEC) Not Detected     Cryptosporidium Not Detected     Cyclospora cayetanensis Not Detected     Entamoeba histolytica Not Detected     Giardia lamblia Not Detected     Adenovirus F40/41 Not Detected     Astrovirus Not Detected     Norovirus GI/GII Not Detected     Rotavirus A Not Detected     Sapovirus (I, II, IV or V) Not Detected    Narrative:       If Aeromonas, Staphylococcus aureus or Bacillus cereus are suspected, please order [id number]: Stool Culture, Aeromonas, S aureus, B Cereus.    Blood Culture - Blood, Arm, Right [[id number]] Collected:  01/08/20 2255    Lab Status:  Preliminary result Specimen:  Blood from Arm, Right Updated:  01/09/20 2330     Blood Culture No growth at 24 hours    Blood Culture - Blood, Arm, Left [[id number]] Collected:  01/08/20 2310    Lab Status:  Preliminary result Specimen:  Blood from Arm, Left Updated:  01/09/20 2330     Blood Culture No growth at 24 hours    Clostridium Difficile Toxin - Stool, Per Rectum [[id number]] Collected:  01/09/20 1931    Lab Status:  Final result Specimen:  Stool from Per Rectum Updated:  01/09/20 2127    Narrative:       The following orders were created for panel order Clostridium Difficile Toxin - Stool, Per Rectum.  Procedure                               Abnormality         Status                     ---------                               -----------          ------                     Clostridium Difficile To...[[id number]]  Abnormal            Final result                 Please view results for these tests on the individual orders.    Clostridium Difficile Toxin, PCR - Stool, Per Rectum [[id number]]  (Abnormal) Collected:  01/09/20 1931    Lab Status:  Final result Specimen:  Stool from Per Rectum Updated:  01/09/20 2127     C. Difficile Toxins by PCR Detected    Narrative:       Performance characteristics of test not established for patients <2 years of age.          Imaging Results (All)     Procedure Component Value Units Date/Time    CT Abdomen Pelvis Without Contrast [[id number]] Collected:  01/08/20 2257     Updated:  01/08/20 2300    Narrative:       CT Abdomen Pelvis WO 1/8/2020    INDICATION:   Loss of appetite and lethargy for 2 months. Diverticulitis.    TECHNIQUE:   CT of the abdomen and pelvis without IV contrast. Coronal and sagittal reconstructions were obtained.  Radiation dose reduction techniques included automated exposure control or exposure modulation based on body size. Count of known CT and cardiac nuc  med studies performed in previous 12 months: 0.     COMPARISON:   None available.    FINDINGS:  Abdomen: The liver, spleen, pancreas, gallbladder and biliary tree, adrenal glands are normal. The left kidney is surgically absent. There is an indeterminate partially calcified 2.1 cm x 1.2 cm lesion on the anterior aspect of the lower pole of the  right kidney. Recommend correlation with nonemergent renal ultrasound for characterization of this finding to exclude a renal mass. Correlation with any prior exams may be helpful. Degenerative change and scoliosis of the visualized thoracolumbar spine.    Pelvis: Colonic diverticulosis is noted. There is thickening of the wall of the sigmoid colon with inflammatory stranding in the surrounding mesenteric fat characteristic of sigmoid diverticulitis. No bowel obstruction or abscess is seen. The gut  is  otherwise normal. There is no significant adenopathy and there is no free fluid in the abdomen or pelvis. The lung bases are unremarkable.      Impression:         1. Sigmoid diverticulitis. No evidence of bowel obstruction or abscess.  2. Indeterminate partially calcified 2.1 cm right renal mass. Correlation with nonemergent renal ultrasound is recommended for characterization of this finding, to exclude renal neoplasm. Correlation with any previous CT scan may be helpful.  3. Surgical absence of the left kidney.  4. Degenerative change and scoliosis of the thoracolumbar spine.      Signer Name: Live Mosley MD   Signed: 1/8/2020 10:57 PM   Workstation Name: [id number]    [facility]          Results for orders placed during the hospital encounter of 01/08/20   Duplex Venous Lower Extremity - Right CAR    Narrative · Normal right lower extremity venous duplex scan.          Results for orders placed during the hospital encounter of 01/08/20   Duplex Venous Lower Extremity - Right CAR    Narrative · Normal right lower extremity venous duplex scan.                Order Current Status    Bone Marrow Exam In process    Bone Marrow Exam In process    D-dimer, Quantitative In process    Fibrin Split Products In process    Fibrinogen In process    Parvovirus B19 Antibody, IgG & IgM In process    Protime-INR In process    aPTT In process    Blood Culture - Blood, Arm, Left Preliminary result    Blood Culture - Blood, Arm, Right Preliminary result        Discharge Details        Discharge Medications      New Medications      Instructions Start Date   melatonin 5 MG tablet tablet   5 mg, Oral, Nightly PRN      vancomycin 50 MG/ML reconstituted solution oral solution reconstituted   125 mg, Oral, Every 6 Hours Scheduled         Continue These Medications      Instructions Start Date   B-1 HIGH POTENCY 100 MG tablet  Generic drug:  thiamine   100 mg, Oral, Daily         Stop These Medications     CBD oral oil  Commonly known as:  cannabidiol     CENTRUM ADULTS PO     coenzyme Q10 100 MG capsule     Cranberry 125 MG tablet     [employer] TURMERIC CURCUMIN 500 MG capsule  Generic drug:  Turmeric     ferrous sulfate 324 (65 Fe) MG tablet delayed-release EC tablet     Omega-3 1400 MG capsule     Saw Palmetto 500 MG capsule     vitamin B-12 100 MCG tablet  Commonly known as:  CYANOCOBALAMIN            No Known Allergies      Discharge Disposition:  Short Term Hospital (DC - External)    Diet:  Hospital:  Diet Order   Procedures   • Diet Regular   NEUTROPENIC    Activity:  As tolerated      Restrictions or Other Recommendations:  NEUTROPENIC precautions.        CODE STATUS:    Code Status and Medical Interventions:   Ordered at: 01/08/20 2102     Level Of Support Discussed With:    Patient     Code Status:    CPR     Medical Interventions (Level of Support Prior to Arrest):    Full       No future appointments.        Time Spent on Discharge:  40 minutes    Electronically signed by Cristel Cope MD, 01/10/20, 12:57 PM.

## 2020-01-10 NOTE — CONSULTS
INFECTIOUS DISEASE CONSULT/INITIAL HOSPITAL VISIT    Rylan Hudson  1945  [id number]    Date of Consult: 1/10/2020    Admission Date: 1/8/2020      Requesting Provider: Edwardo Wright, *  Evaluating Physician: RASHAD Ross    Reason for Consultation: Neutropenia/pancytopenia    History of present illness:    Patient is a 74 y.o. male male with history of renal cell carcinoma treated with nephrectomy in 2015 has developed diarrhea and crampy abdominal pain since around Thanksgiving 2019.  Patient was thought to have enteritis was ultimately given a course of ciprofloxacin followed by metronidazole with improvement of diarrhea.  CT imaging markable for sigmoid diverticulitis.  Patient diarrhea is improving but has new onset weakness and fevers and rigors patient has 15 to 20 pounds of weight loss since November.  Because of weakness and diarrhea patient has been admitted to hospital.  Patient found to have pancytopenia on peripheral blood work.  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  Patient quit smoking does not use alcohol or illicit drugs.    Patient is  and lives locally.    CT scan from 1/8/2020 remarkable for sigmoid diverticulitis as well as partially calcified right renal mass.    1/10/2020: Patient has had a T-max of 100.3 in the past 24 hours but otherwise been hemodynamically stable.  Patient remains leukopenic and neutropenic with a current white blood cell count of 0.63 and anemic with an H&H of 6.9 and 19.5 and thrombocytopenic with a platelet count of 18.  GI panel was negative.  Unable to do a complete assessment as patient is currently in middle receiving bone marrow aspiration.  Did discuss with nurse who states that diarrhea is improving and he is tolerating his antibiotics well.    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    Social History     Socioeconomic History   • Marital status:      Spouse name: Not on file   • Number of children: Not on file   • Years of education: Not on file   • Highest education level: Not on file   Tobacco Use   • Smoking status: Former Smoker   • Smokeless tobacco: Never Used   Substance and Sexual Activity   • Alcohol use: No   • Drug use: No       No Known Allergies      Medication:    Current Facility-Administered Medications:   •  acetaminophen (TYLENOL) tablet 650 mg, 650 mg, Oral, Q4H PRN, 650 mg at 01/10/20 0833 **OR** acetaminophen (TYLENOL) 160 MG/5ML solution 650 mg, 650 mg, Oral, Q4H PRN **OR** acetaminophen (TYLENOL) suppository 650 mg, 650 mg, Rectal, Q4H PRN, Debbie Gunter PA-C  •  melatonin tablet 5 mg, 5 mg, Oral, Nightly PRN, Debbie Gunter PA-C  •  ondansetron (ZOFRAN) tablet 4 mg, 4 mg, Oral, Q6H PRN, Debbie Gunter PA-C  •  Pharmacy Consult - change PPI to famotidine per [facility]I tx protocol <<<, , Does not apply, Continuous PRN, Erin Neri APRN  •  sodium chloride 0.9 % flush 10 mL, 10 mL, Intravenous, Q12H, Debbie Gunter, PA-C, 10 mL at 01/09/20 2118  •  sodium chloride 0.9 % flush 10 mL, 10 mL, Intravenous, PRN, Debbie Gunter PA-C  •  sodium chloride 0.9 % infusion, 100 mL/hr, Intravenous, Continuous, Debbie Gunter PA-C, Last Rate: 100 mL/hr at 01/08/20 2249, 100 mL/hr at 01/08/20 2249  •  vancomycin oral solution reconstituted 125 mg, 125 mg, Oral, Q6H, Erin Neri APRN, 125 mg at 01/10/20 0510    Antibiotics:  Anti-Infectives (From admission, onward)    Ordered     Dose/Rate Route Frequency Start Stop    01/09/20 2127  vancomycin oral solution reconstituted 125 mg     Kathy Hernandez [facility] reviewed the order on 01/10/20 0824.   Ordering Provider:  Erin Neri APRN    125 mg Oral Every 6 Hours Scheduled 01/09/20 2230 01/19/20 2359    01/08/20 2136   meropenem (MERREM) 1 g/100 mL 0.9% NS VTB (mbp)     Ordering Provider:  Debbie Gunter PA-C    1 g  over 30 Minutes Intravenous Once 20 0003            Review of Systems:  See HPI          Physical Exam:   Vital Signs  Temp (24hrs), Av.6 °F (37 °C), Min:97.7 °F (36.5 °C), Max:100.3 °F (37.9 °C)    Temp  Min: 97.7 °F (36.5 °C)  Max: 100.3 °F (37.9 °C)  BP  Min: 109/69  Max: 145/81  Pulse  Min: 93  Max: 110  Resp  Min: 16  Max: 20  SpO2  Min: 95 %  Max: 100 %    GENERAL: Awake and alert, no acute distress  Unable to assess rest of physical assessment as patient is actively receiving procedure    Laboratory Data    Results from last 7 days   Lab Units 01/10/20  0609 20  1734 20  2317   WBC 10*3/mm3 0.63* 0.48* 0.52*   HEMOGLOBIN g/dL 6.9* 7.6* 5.0*   HEMATOCRIT % 19.5* 23.3* 14.6*   PLATELETS 10*3/mm3 18* 34* 8*     Results from last 7 days   Lab Units 20  1734   SODIUM mmol/L 137   POTASSIUM mmol/L 3.2*   CHLORIDE mmol/L 98   CO2 mmol/L 25.0   BUN mg/dL 27*   CREATININE mg/dL 1.36*   GLUCOSE mg/dL 102*   CALCIUM mg/dL 9.0     Results from last 7 days   Lab Units 20  2126   ALK PHOS U/L 58   BILIRUBIN mg/dL 0.6   ALT (SGPT) U/L 9   AST (SGOT) U/L 12             Results from last 7 days   Lab Units 20  1734   LACTATE mmol/L 1.1             Estimated Creatinine Clearance: 58.4 mL/min (A) (by C-G formula based on SCr of 1.36 mg/dL (H)).      Microbiology:  Microbiology Results (last 10 days)     Procedure Component Value - Date/Time    Gastrointestinal Panel, PCR - Stool, Per Rectum [[id number]]  (Normal) Collected:  20    Lab Status:  Final result Specimen:  Stool from Per Rectum Updated:  01/10/20 0015     Campylobacter Not Detected     Plesiomonas shigelloides Not Detected     Salmonella Not Detected     Vibrio Not Detected     Vibrio cholerae Not Detected     Yersinia enterocolitica Not Detected     Enteroaggregative E. coli (EAEC) Not Detected      Enteropathogenic E. coli (EPEC) Not Detected     Enterotoxigenic E. coli (ETEC) lt/st Not Detected     Shiga-like toxin-producing E. coli (STEC) stx1/stx2 Not Detected     E. coli O157 Not Detected     Shigella/Enteroinvasive E. coli (EIEC) Not Detected     Cryptosporidium Not Detected     Cyclospora cayetanensis Not Detected     Entamoeba histolytica Not Detected     Giardia lamblia Not Detected     Adenovirus F40/41 Not Detected     Astrovirus Not Detected     Norovirus GI/GII Not Detected     Rotavirus A Not Detected     Sapovirus (I, II, IV or V) Not Detected    Narrative:       If Aeromonas, Staphylococcus aureus or Bacillus cereus are suspected, please order [id number]: Stool Culture, Aeromonas, S aureus, B Cereus.    Clostridium Difficile Toxin - Stool, Per Rectum [[id number]] Collected:  01/09/20 1931    Lab Status:  Final result Specimen:  Stool from Per Rectum Updated:  01/09/20 2127    Narrative:       The following orders were created for panel order Clostridium Difficile Toxin - Stool, Per Rectum.  Procedure                               Abnormality         Status                     ---------                               -----------         ------                     Clostridium Difficile To...[[id number]]  Abnormal            Final result                 Please view results for these tests on the individual orders.    Clostridium Difficile Toxin, PCR - Stool, Per Rectum [[id number]]  (Abnormal) Collected:  01/09/20 1931    Lab Status:  Final result Specimen:  Stool from Per Rectum Updated:  01/09/20 2127     C. Difficile Toxins by PCR Detected    Narrative:       Performance characteristics of test not established for patients <2 years of age.    Blood Culture - Blood, Arm, Left [[id number]] Collected:  01/08/20 2310    Lab Status:  Preliminary result Specimen:  Blood from Arm, Left Updated:  01/09/20 2330     Blood Culture No growth at 24 hours    Blood Culture - Blood, Arm, Right [[id number]] Collected:   01/08/20 2255    Lab Status:  Preliminary result Specimen:  Blood from Arm, Right Updated:  01/09/20 2330     Blood Culture No growth at 24 hours              Radiology:  Imaging Results (Last 72 Hours)     Procedure Component Value Units Date/Time    CT Abdomen Pelvis Without Contrast [[id number]] Collected:  01/08/20 2257     Updated:  01/08/20 2300    Narrative:       CT Abdomen Pelvis WO 1/8/2020    INDICATION:   Loss of appetite and lethargy for 2 months. Diverticulitis.    TECHNIQUE:   CT of the abdomen and pelvis without IV contrast. Coronal and sagittal reconstructions were obtained.  Radiation dose reduction techniques included automated exposure control or exposure modulation based on body size. Count of known CT and cardiac nuc  med studies performed in previous 12 months: 0.     COMPARISON:   None available.    FINDINGS:  Abdomen: The liver, spleen, pancreas, gallbladder and biliary tree, adrenal glands are normal. The left kidney is surgically absent. There is an indeterminate partially calcified 2.1 cm x 1.2 cm lesion on the anterior aspect of the lower pole of the  right kidney. Recommend correlation with nonemergent renal ultrasound for characterization of this finding to exclude a renal mass. Correlation with any prior exams may be helpful. Degenerative change and scoliosis of the visualized thoracolumbar spine.    Pelvis: Colonic diverticulosis is noted. There is thickening of the wall of the sigmoid colon with inflammatory stranding in the surrounding mesenteric fat characteristic of sigmoid diverticulitis. No bowel obstruction or abscess is seen. The gut is  otherwise normal. There is no significant adenopathy and there is no free fluid in the abdomen or pelvis. The lung bases are unremarkable.      Impression:         1. Sigmoid diverticulitis. No evidence of bowel obstruction or abscess.  2. Indeterminate partially calcified 2.1 cm right renal mass. Correlation with nonemergent renal ultrasound  is recommended for characterization of this finding, to exclude renal neoplasm. Correlation with any previous CT scan may be helpful.  3. Surgical absence of the left kidney.  4. Degenerative change and scoliosis of the thoracolumbar spine.      Signer Name: Live Mosley MD   Signed: 1/8/2020 10:57 PM   Workstation Name: [id number]    [facility]            Impression:   Pancytopenia  Recent flagyl  Resolving diarrhea  Rigors  Neutropenia ?  AML  ? Diverticulitis of sigmoid colon  H/o renal cell carcinoma 2015  Right renal mass  cdiff colitis by pcr  ? typhlitis        PLAN/RECOMMENDATIONS:   Thank you for asking us to see Rylan Hudson, I recommend the following:  Patient was doing well until around Thanksgiving is been given a course of ciprofloxacin followed by Flagyl and is now found to have pancytopenia.  Metronidazole can cause leukopenia and thrombocytopenia.  While this seems an unlikely explanation may be possible.  If white blood cell counts and platelet counts do not start to increase I would recommend proceeding with bone marrow biopsy to rule out malignancy.    Continue oral vancomycin    Add tigecycline 100 mg iv now and then 50 mg iv q12h  I have seen and examined patient and agree with above  Dr. Sundar Alfonso has obtained the history, performed the physical exam and formulated the above treatment plan.     Defer to oncology regarding potential transfer to [facility]    RASHAD Ross  1/10/2020  10:57 AM

## 2020-01-10 NOTE — PLAN OF CARE
Problem: Patient Care Overview  Goal: Plan of Care Review  Outcome: Ongoing (interventions implemented as appropriate)  Flowsheets  Taken 1/10/2020 0143  Progress: no change  Taken 1/9/2020 2000  Plan of Care Reviewed With: patient;spouse  Note:   Rested well this shift. Placed in contact spore precautions r/t positive C-diff. No diarrhea stools this shift.  Also remains in neutropenic precautions. NS infusing at 100ml/hr. Bone marrow biopsy to be performed tomorrow.

## 2020-01-10 NOTE — PROGRESS NOTES
"                  Clinical Nutrition     Reason for Visit:   Identified at risk by screening criteria, MST score 2+    Patient Name: Rylan Hudson  YOB: 1945  MRN: [id number]  Date of Encounter: 01/10/20 9:53 AM  Admission date: 1/8/2020    Nutrition Assessment   Assessment     Admission diagnosis  Sepsis  Fever    Additional diagnosis/conditions/procedures this admission  Persistent diarrhea  Sigmoid diverticulitis on CT  Pancytopenia  +c. diff 1/9    Additional PMH/procedures:  Renal cell carcinoma s/p radical L nephrectomy (2016)    Reported/Observed/Food/Nutrition Related History:      Patient and multiple family members in room at visit. Patient refusing lunch. Wife reports patient was weighing heavier than his usual prior to onset of GI symptoms around the week prior to Thanksgiving. Reports patient was weighing ~250 lbs. He is current at his UBW of ~235 lbs after losing weight over the past 2 months. Wife and patient confirm minimal PO intake since Thanksgiving. Patient had an appetite and was hungry but food would pass through him quickly soon after eating. Wife reported she was encouraging him to drink Ensure Max supplements and he would drink about x2 daily. Intake was sporadic, would sometimes be able to eat boiled eggs, peanut butter sandwiches, but overall PO intake was less than 50% of normal for ~2 months. Patient ate some of breakfast this morning and tolerated fine until he had to use the bathroom shortly after. Patient and wife reported plan to transport to  for further treatment.      Anthropometrics     Height: 182.9 cm (72\")  Last filed wt: Weight: 107 kg (236 lb 3.2 oz) (01/08/20 1953)    BMI: BMI (Calculated): 32  Obese Class I: 30-34.9kg/m2    Ideal Body Weight (IBW) (kg): 82.07  Admission wt: 232 lbs  Method obtained: weight per charting on admission    Weight Change   UBW: 250 lbs prior to thanksgiving  Weight change: 14 lbs   % wt change: 5.9%  Time frame of weight " loss: ~2.5 months     Labs reviewed     Results from last 7 days   Lab Units 01/09/20  1734 01/08/20  2126   GLUCOSE mg/dL 102* 110*   BUN mg/dL 27* 31*   CREATININE mg/dL 1.36* 1.43*   SODIUM mmol/L 137 136   CHLORIDE mmol/L 98 101   POTASSIUM mmol/L 3.2* 3.7   ALT (SGPT) U/L  --  9     Results from last 7 days   Lab Units 01/08/20  2126   ALBUMIN g/dL 3.30*     Medications reviewed   Pertinent: tygacil    Current Nutrition Prescription     PO: Diet Regular  Intake: insuff data    Nutrition Diagnosis     1/10  Problem Unintended weight loss   Etiology Decreased appetite / GI distress    Signs/Symptoms Unintentional weight loss of 14 lbs (5.9%) in past 2 months     1/10  Problem Predicted suboptimal energy intake   Etiology Decreased appetite   Signs/Symptoms Report of minimal PO intake for >2 months prior to admission       Nutrition Intervention   1.  Follow treatment progress, Care plan reviewed  2.  Advise alternate selection, Interview for preferences   3. Preferences ordered for alternate lunch tray  4. RD to continue to monitor patient PO intake/tolerance    Goal:   General: Nutrition support treatment  PO: Establish PO, Tolerate PO      Monitoring/Evaluation:   Per protocol, PO intake, Weight, GI status    Will Continue to follow per protocol    Charisse Schumacher RDN, LD  Time Spent: 35 minutes

## 2020-01-11 LAB
ABO + RH BLD: NORMAL
BH BB BLOOD EXPIRATION DATE: NORMAL
BH BB BLOOD TYPE BARCODE: 6200
BH BB DISPENSE STATUS: NORMAL
BH BB PRODUCT CODE: NORMAL
BH BB UNIT NUMBER: NORMAL
UNIT  ABO: NORMAL
UNIT  RH: NORMAL

## 2020-01-13 LAB
B19V IGG SER IA-ACNC: 5.1 INDEX (ref 0–0.8)
B19V IGM SER IA-ACNC: 0.2 INDEX (ref 0–0.8)
BACTERIA SPEC AEROBE CULT: NORMAL
BACTERIA SPEC AEROBE CULT: NORMAL

## 2020-01-21 LAB
LAB AP ASPIRATE SMEAR: NORMAL
LAB AP CASE REPORT: NORMAL
LAB AP CBC AND DIFFERENTIAL: NORMAL
LAB AP CLINICAL INFORMATION: NORMAL
LAB AP CLOT SECTION: NORMAL
LAB AP CORE BIOPSY: NORMAL
LAB AP CYTOGENETICS REPORT,ADDENDUM: NORMAL
LAB AP DIAGNOSIS COMMENT: NORMAL
PATH REPORT.FINAL DX SPEC: NORMAL
PATH REPORT.GROSS SPEC: NORMAL

## 2020-04-05 ENCOUNTER — DOCUMENTATION (OUTPATIENT)
Dept: FAMILY MEDICINE CLINIC | Facility: CLINIC | Age: 75
End: 2020-04-05

## 2020-04-05 NOTE — PROGRESS NOTES
I talked to the patient's spouse on the telephone today. He recently has returned from a long stay at the [facility] for management of leukemia and also pneumonia. He is recovered from those processes and is in remission I am told by his wife. He is had difficulties with borderline hypotension as well as chronic tachycardia. For the tachycardia he takes metoprolol 25 mg every 8 hours. She is monitoring his blood pressure and heart rate on a several times a day basis. His blood pressure systolically this morning was 91 and 92. She called Dr. Hahn earlier who was concerned about the possibility of shock or sepsis. However, the patient's spouse assures me that he is feeling well. He is still weak and primarily bedfast. His blood pressure now is 105 systolic. His heart rate is 110. I recommended that he continue to drink plenty of fluids which he has been doing. His diarrhea has resolved. I recommended continuing to check his blood pressure and heart rate every 8 hours and as long as his systolic blood pressure is above 100 he can take his next scheduled dose of metoprolol 25 mg. The patient and his spouse are satisfied with this plan. They will call us as needed.

## 2020-04-06 ENCOUNTER — LAB REQUISITION (OUTPATIENT)
Dept: LAB | Facility: HOSPITAL | Age: 75
End: 2020-04-06

## 2020-04-06 ENCOUNTER — TELEPHONE (OUTPATIENT)
Dept: FAMILY MEDICINE CLINIC | Facility: CLINIC | Age: 75
End: 2020-04-06

## 2020-04-06 DIAGNOSIS — N18.30 CHRONIC KIDNEY DISEASE, STAGE 3 (MODERATE): ICD-10-CM

## 2020-04-06 DIAGNOSIS — C95.90: ICD-10-CM

## 2020-04-06 LAB
ALBUMIN SERPL-MCNC: 2.7 G/DL (ref 3.5–5.2)
ALBUMIN/GLOB SERPL: 0.7 G/DL
ALP SERPL-CCNC: 91 U/L (ref 39–117)
ALT SERPL W P-5'-P-CCNC: 7 U/L (ref 1–41)
ANION GAP SERPL CALCULATED.3IONS-SCNC: 10 MMOL/L (ref 5–15)
AST SERPL-CCNC: 17 U/L (ref 1–40)
BASOPHILS # BLD AUTO: 0.03 10*3/MM3 (ref 0–0.2)
BASOPHILS NFR BLD AUTO: 0.4 % (ref 0–1.5)
BILIRUB SERPL-MCNC: 0.5 MG/DL (ref 0.2–1.2)
BUN BLD-MCNC: 16 MG/DL (ref 8–23)
BUN/CREAT SERPL: 16.7 (ref 7–25)
CALCIUM SPEC-SCNC: 8.1 MG/DL (ref 8.6–10.5)
CHLORIDE SERPL-SCNC: 100 MMOL/L (ref 98–107)
CO2 SERPL-SCNC: 23 MMOL/L (ref 22–29)
CREAT BLD-MCNC: 0.96 MG/DL (ref 0.76–1.27)
DEPRECATED RDW RBC AUTO: 78.2 FL (ref 37–54)
EOSINOPHIL # BLD AUTO: 0.03 10*3/MM3 (ref 0–0.4)
EOSINOPHIL NFR BLD AUTO: 0.4 % (ref 0.3–6.2)
ERYTHROCYTE [DISTWIDTH] IN BLOOD BY AUTOMATED COUNT: 18.7 % (ref 12.3–15.4)
GFR SERPL CREATININE-BSD FRML MDRD: 76 ML/MIN/1.73
GLOBULIN UR ELPH-MCNC: 4.1 GM/DL
GLUCOSE BLD-MCNC: 126 MG/DL (ref 65–99)
HCT VFR BLD AUTO: 27.8 % (ref 37.5–51)
HGB BLD-MCNC: 8.3 G/DL (ref 13–17.7)
IMM GRANULOCYTES # BLD AUTO: 0.04 10*3/MM3 (ref 0–0.05)
IMM GRANULOCYTES NFR BLD AUTO: 0.6 % (ref 0–0.5)
LYMPHOCYTES # BLD AUTO: 1.74 10*3/MM3 (ref 0.7–3.1)
LYMPHOCYTES NFR BLD AUTO: 25.5 % (ref 19.6–45.3)
MACROCYTES BLD QL SMEAR: NORMAL
MCH RBC QN AUTO: 33.6 PG (ref 26.6–33)
MCHC RBC AUTO-ENTMCNC: 29.9 G/DL (ref 31.5–35.7)
MCV RBC AUTO: 112.6 FL (ref 79–97)
MONOCYTES # BLD AUTO: 0.56 10*3/MM3 (ref 0.1–0.9)
MONOCYTES NFR BLD AUTO: 8.2 % (ref 5–12)
NEUTROPHILS # BLD AUTO: 4.42 10*3/MM3 (ref 1.7–7)
NEUTROPHILS NFR BLD AUTO: 64.9 % (ref 42.7–76)
NRBC BLD AUTO-RTO: 0 /100 WBC (ref 0–0.2)
PLAT MORPH BLD: NORMAL
PLATELET # BLD AUTO: 284 10*3/MM3 (ref 140–450)
PMV BLD AUTO: 10 FL (ref 6–12)
POTASSIUM BLD-SCNC: 3.4 MMOL/L (ref 3.5–5.2)
PROT SERPL-MCNC: 6.8 G/DL (ref 6–8.5)
RBC # BLD AUTO: 2.47 10*6/MM3 (ref 4.14–5.8)
SODIUM BLD-SCNC: 133 MMOL/L (ref 136–145)
WBC MORPH BLD: NORMAL
WBC NRBC COR # BLD: 6.82 10*3/MM3 (ref 3.4–10.8)

## 2020-04-06 PROCEDURE — 80053 COMPREHEN METABOLIC PANEL: CPT | Performed by: INTERNAL MEDICINE

## 2020-04-06 PROCEDURE — 85007 BL SMEAR W/DIFF WBC COUNT: CPT | Performed by: INTERNAL MEDICINE

## 2020-04-06 PROCEDURE — 85025 COMPLETE CBC W/AUTO DIFF WBC: CPT | Performed by: INTERNAL MEDICINE

## 2020-04-06 NOTE — TELEPHONE ENCOUNTER
Sharonda rankin RN called and stated that the patient is running a low blood pressure: 90/68, heart rate between 150-160.  The patient has leukemia and chronic kidney disease, stage 3.  Sharonda would like to know what Dr. Fermin would like her to do.      Sharonda callback: [phone]    Please advise.    I spoke to the patient on the telephone.  Actually to the nurse.  He is not having any acute symptoms.  His heart rate initially was 160 and blood pressure was 90/60.  He has been drinking more fluids and his heart rate is now down to 126 and his blood pressure is up to 96/66.  He is having no fever.  I advised that they simply carry through with checking the initially ordered labs which is a CBC and a basic metabolic panel.  Report those results back to us.  In the meantime I the patient rest and continue getting plenty of fluids.

## 2020-04-08 ENCOUNTER — TELEPHONE (OUTPATIENT)
Dept: FAMILY MEDICINE CLINIC | Facility: CLINIC | Age: 75
End: 2020-04-08

## 2020-04-08 RX ORDER — POTASSIUM CHLORIDE 750 MG/1
10 CAPSULE, EXTENDED RELEASE ORAL DAILY
Qty: 30 CAPSULE | Refills: 5 | Status: SHIPPED | OUTPATIENT
Start: 2020-04-08 | End: 2020-10-15

## 2020-04-08 NOTE — TELEPHONE ENCOUNTER
I discussed the results of the patient's laboratory studies with the patient's wife.  I advised her that his white blood count and platelet count are now normal.  He is still anemic but his hemoglobin has improved.  His metabolic panel reveals a normal renal function.  His glucose level was minimally elevated.  However his albumin level is low.  He has lost a great deal of weight and his dentures and now poorly fitted.  He also has a history of some diarrhea following his C. difficile infection.  That is doing better but he tends to be intolerant to dairy products.  I recommended that they try boost and Ensure which they have done.  He also was noted to be hypokalemic with a potassium of 3.4.  We will send in a prescription for potassium chloride 10 mEq capsules 1 a day #30 with 5 refills.

## 2020-04-09 ENCOUNTER — LAB REQUISITION (OUTPATIENT)
Dept: LAB | Facility: HOSPITAL | Age: 75
End: 2020-04-09

## 2020-04-09 DIAGNOSIS — C92.40 ACUTE PROMYELOCYTIC LEUKEMIA, NOT HAVING ACHIEVED REMISSION (HCC): ICD-10-CM

## 2020-04-09 LAB
ALBUMIN SERPL-MCNC: 2.7 G/DL (ref 3.5–5.2)
ALBUMIN/GLOB SERPL: 0.9 G/DL
ALP SERPL-CCNC: 85 U/L (ref 39–117)
ALT SERPL W P-5'-P-CCNC: 13 U/L (ref 1–41)
ANION GAP SERPL CALCULATED.3IONS-SCNC: 12 MMOL/L (ref 5–15)
AST SERPL-CCNC: 19 U/L (ref 1–40)
BASOPHILS # BLD AUTO: 0.04 10*3/MM3 (ref 0–0.2)
BASOPHILS NFR BLD AUTO: 0.6 % (ref 0–1.5)
BILIRUB SERPL-MCNC: 0.3 MG/DL (ref 0.2–1.2)
BUN BLD-MCNC: 13 MG/DL (ref 8–23)
BUN/CREAT SERPL: 13.8 (ref 7–25)
CALCIUM SPEC-SCNC: 7.6 MG/DL (ref 8.6–10.5)
CHLORIDE SERPL-SCNC: 99 MMOL/L (ref 98–107)
CO2 SERPL-SCNC: 21 MMOL/L (ref 22–29)
CREAT BLD-MCNC: 0.94 MG/DL (ref 0.76–1.27)
DEPRECATED RDW RBC AUTO: 60.1 FL (ref 37–54)
EOSINOPHIL # BLD AUTO: 0.09 10*3/MM3 (ref 0–0.4)
EOSINOPHIL NFR BLD AUTO: 1.4 % (ref 0.3–6.2)
ERYTHROCYTE [DISTWIDTH] IN BLOOD BY AUTOMATED COUNT: 16.3 % (ref 12.3–15.4)
GFR SERPL CREATININE-BSD FRML MDRD: 78 ML/MIN/1.73
GLOBULIN UR ELPH-MCNC: 2.9 GM/DL
GLUCOSE BLD-MCNC: 81 MG/DL (ref 65–99)
HCT VFR BLD AUTO: 23.8 % (ref 37.5–51)
HGB BLD-MCNC: 7.7 G/DL (ref 13–17.7)
IMM GRANULOCYTES # BLD AUTO: 0.02 10*3/MM3 (ref 0–0.05)
IMM GRANULOCYTES NFR BLD AUTO: 0.3 % (ref 0–0.5)
LYMPHOCYTES # BLD AUTO: 1.41 10*3/MM3 (ref 0.7–3.1)
LYMPHOCYTES NFR BLD AUTO: 22.2 % (ref 19.6–45.3)
MCH RBC QN AUTO: 32.6 PG (ref 26.6–33)
MCHC RBC AUTO-ENTMCNC: 32.4 G/DL (ref 31.5–35.7)
MCV RBC AUTO: 100.8 FL (ref 79–97)
MONOCYTES # BLD AUTO: 0.6 10*3/MM3 (ref 0.1–0.9)
MONOCYTES NFR BLD AUTO: 9.5 % (ref 5–12)
NEUTROPHILS # BLD AUTO: 4.18 10*3/MM3 (ref 1.7–7)
NEUTROPHILS NFR BLD AUTO: 66 % (ref 42.7–76)
NRBC BLD AUTO-RTO: 0 /100 WBC (ref 0–0.2)
PLATELET # BLD AUTO: 229 10*3/MM3 (ref 140–450)
PMV BLD AUTO: 9.9 FL (ref 6–12)
POTASSIUM BLD-SCNC: 3.6 MMOL/L (ref 3.5–5.2)
PROT SERPL-MCNC: 5.6 G/DL (ref 6–8.5)
RBC # BLD AUTO: 2.36 10*6/MM3 (ref 4.14–5.8)
SODIUM BLD-SCNC: 132 MMOL/L (ref 136–145)
WBC NRBC COR # BLD: 6.34 10*3/MM3 (ref 3.4–10.8)

## 2020-04-09 PROCEDURE — 80053 COMPREHEN METABOLIC PANEL: CPT | Performed by: INTERNAL MEDICINE

## 2020-04-09 PROCEDURE — 85025 COMPLETE CBC W/AUTO DIFF WBC: CPT | Performed by: INTERNAL MEDICINE

## 2020-04-13 ENCOUNTER — LAB REQUISITION (OUTPATIENT)
Dept: LAB | Facility: HOSPITAL | Age: 75
End: 2020-04-13

## 2020-04-13 DIAGNOSIS — C92.40 ACUTE PROMYELOCYTIC LEUKEMIA, NOT HAVING ACHIEVED REMISSION (HCC): ICD-10-CM

## 2020-04-13 LAB
ALBUMIN SERPL-MCNC: 2.6 G/DL (ref 3.5–5.2)
ALBUMIN/GLOB SERPL: 0.7 G/DL
ALP SERPL-CCNC: 77 U/L (ref 39–117)
ALT SERPL W P-5'-P-CCNC: 10 U/L (ref 1–41)
ANION GAP SERPL CALCULATED.3IONS-SCNC: 11 MMOL/L (ref 5–15)
AST SERPL-CCNC: 18 U/L (ref 1–40)
BASOPHILS # BLD AUTO: 0.06 10*3/MM3 (ref 0–0.2)
BASOPHILS NFR BLD AUTO: 0.8 % (ref 0–1.5)
BILIRUB SERPL-MCNC: 0.5 MG/DL (ref 0.2–1.2)
BUN BLD-MCNC: 11 MG/DL (ref 8–23)
BUN/CREAT SERPL: 12.2 (ref 7–25)
CALCIUM SPEC-SCNC: 8 MG/DL (ref 8.6–10.5)
CHLORIDE SERPL-SCNC: 103 MMOL/L (ref 98–107)
CO2 SERPL-SCNC: 24 MMOL/L (ref 22–29)
CREAT BLD-MCNC: 0.9 MG/DL (ref 0.76–1.27)
DEPRECATED RDW RBC AUTO: 66.7 FL (ref 37–54)
EOSINOPHIL # BLD AUTO: 0.08 10*3/MM3 (ref 0–0.4)
EOSINOPHIL NFR BLD AUTO: 1 % (ref 0.3–6.2)
ERYTHROCYTE [DISTWIDTH] IN BLOOD BY AUTOMATED COUNT: 16.7 % (ref 12.3–15.4)
GFR SERPL CREATININE-BSD FRML MDRD: 82 ML/MIN/1.73
GLOBULIN UR ELPH-MCNC: 3.7 GM/DL
GLUCOSE BLD-MCNC: 83 MG/DL (ref 65–99)
HCT VFR BLD AUTO: 27.6 % (ref 37.5–51)
HGB BLD-MCNC: 8.3 G/DL (ref 13–17.7)
IMM GRANULOCYTES # BLD AUTO: 0.05 10*3/MM3 (ref 0–0.05)
IMM GRANULOCYTES NFR BLD AUTO: 0.6 % (ref 0–0.5)
LYMPHOCYTES # BLD AUTO: 1.7 10*3/MM3 (ref 0.7–3.1)
LYMPHOCYTES NFR BLD AUTO: 22 % (ref 19.6–45.3)
MCH RBC QN AUTO: 32.7 PG (ref 26.6–33)
MCHC RBC AUTO-ENTMCNC: 30.1 G/DL (ref 31.5–35.7)
MCV RBC AUTO: 108.7 FL (ref 79–97)
MONOCYTES # BLD AUTO: 0.58 10*3/MM3 (ref 0.1–0.9)
MONOCYTES NFR BLD AUTO: 7.5 % (ref 5–12)
NEUTROPHILS # BLD AUTO: 5.24 10*3/MM3 (ref 1.7–7)
NEUTROPHILS NFR BLD AUTO: 68.1 % (ref 42.7–76)
NRBC BLD AUTO-RTO: 0 /100 WBC (ref 0–0.2)
PLATELET # BLD AUTO: 265 10*3/MM3 (ref 140–450)
PMV BLD AUTO: 10 FL (ref 6–12)
POTASSIUM BLD-SCNC: 3.4 MMOL/L (ref 3.5–5.2)
PROT SERPL-MCNC: 6.3 G/DL (ref 6–8.5)
RBC # BLD AUTO: 2.54 10*6/MM3 (ref 4.14–5.8)
SODIUM BLD-SCNC: 138 MMOL/L (ref 136–145)
WBC NRBC COR # BLD: 7.71 10*3/MM3 (ref 3.4–10.8)

## 2020-04-13 PROCEDURE — 85025 COMPLETE CBC W/AUTO DIFF WBC: CPT | Performed by: INTERNAL MEDICINE

## 2020-04-13 PROCEDURE — 80053 COMPREHEN METABOLIC PANEL: CPT | Performed by: INTERNAL MEDICINE

## 2020-04-16 ENCOUNTER — LAB REQUISITION (OUTPATIENT)
Dept: LAB | Facility: HOSPITAL | Age: 75
End: 2020-04-16

## 2020-04-16 ENCOUNTER — TELEPHONE (OUTPATIENT)
Dept: FAMILY MEDICINE CLINIC | Facility: CLINIC | Age: 75
End: 2020-04-16

## 2020-04-16 DIAGNOSIS — C92.40 ACUTE PROMYELOCYTIC LEUKEMIA, NOT HAVING ACHIEVED REMISSION (HCC): ICD-10-CM

## 2020-04-16 LAB
ALBUMIN SERPL-MCNC: 2.7 G/DL (ref 3.5–5.2)
ALBUMIN/GLOB SERPL: 0.7 G/DL
ALP SERPL-CCNC: 82 U/L (ref 39–117)
ALT SERPL W P-5'-P-CCNC: 9 U/L (ref 1–41)
ANION GAP SERPL CALCULATED.3IONS-SCNC: 13.2 MMOL/L (ref 5–15)
AST SERPL-CCNC: 19 U/L (ref 1–40)
BILIRUB SERPL-MCNC: 0.3 MG/DL (ref 0.2–1.2)
BUN BLD-MCNC: 9 MG/DL (ref 8–23)
BUN/CREAT SERPL: 9.8 (ref 7–25)
CALCIUM SPEC-SCNC: 8.1 MG/DL (ref 8.6–10.5)
CHLORIDE SERPL-SCNC: 102 MMOL/L (ref 98–107)
CO2 SERPL-SCNC: 23.8 MMOL/L (ref 22–29)
CREAT BLD-MCNC: 0.92 MG/DL (ref 0.76–1.27)
DEPRECATED RDW RBC AUTO: 66.8 FL (ref 37–54)
EOSINOPHIL # BLD MANUAL: 0.09 10*3/MM3 (ref 0–0.4)
EOSINOPHIL NFR BLD MANUAL: 1 % (ref 0.3–6.2)
ERYTHROCYTE [DISTWIDTH] IN BLOOD BY AUTOMATED COUNT: 16.8 % (ref 12.3–15.4)
GFR SERPL CREATININE-BSD FRML MDRD: 80 ML/MIN/1.73
GLOBULIN UR ELPH-MCNC: 3.8 GM/DL
GLUCOSE BLD-MCNC: 54 MG/DL (ref 65–99)
HCT VFR BLD AUTO: 30.3 % (ref 37.5–51)
HGB BLD-MCNC: 9.1 G/DL (ref 13–17.7)
LYMPHOCYTES # BLD MANUAL: 1.59 10*3/MM3 (ref 0.7–3.1)
LYMPHOCYTES NFR BLD MANUAL: 1 % (ref 5–12)
LYMPHOCYTES NFR BLD MANUAL: 17 % (ref 19.6–45.3)
MACROCYTES BLD QL SMEAR: ABNORMAL
MCH RBC QN AUTO: 32.3 PG (ref 26.6–33)
MCHC RBC AUTO-ENTMCNC: 30 G/DL (ref 31.5–35.7)
MCV RBC AUTO: 107.4 FL (ref 79–97)
MONOCYTES # BLD AUTO: 0.09 10*3/MM3 (ref 0.1–0.9)
NEUTROPHILS # BLD AUTO: 7.6 10*3/MM3 (ref 1.7–7)
NEUTROPHILS NFR BLD MANUAL: 81 % (ref 42.7–76)
PLATELET # BLD AUTO: 302 10*3/MM3 (ref 140–450)
PMV BLD AUTO: 9.9 FL (ref 6–12)
POTASSIUM BLD-SCNC: 4.2 MMOL/L (ref 3.5–5.2)
PROT SERPL-MCNC: 6.5 G/DL (ref 6–8.5)
RBC # BLD AUTO: 2.82 10*6/MM3 (ref 4.14–5.8)
SMALL PLATELETS BLD QL SMEAR: ADEQUATE
SODIUM BLD-SCNC: 139 MMOL/L (ref 136–145)
WBC MORPH BLD: NORMAL
WBC NRBC COR # BLD: 9.38 10*3/MM3 (ref 3.4–10.8)

## 2020-04-16 PROCEDURE — 80053 COMPREHEN METABOLIC PANEL: CPT | Performed by: INTERNAL MEDICINE

## 2020-04-16 PROCEDURE — 85007 BL SMEAR W/DIFF WBC COUNT: CPT | Performed by: INTERNAL MEDICINE

## 2020-04-16 PROCEDURE — 85027 COMPLETE CBC AUTOMATED: CPT | Performed by: INTERNAL MEDICINE

## 2020-04-16 NOTE — TELEPHONE ENCOUNTER
Noted.   For future references if needed these are measurements that was given to us.    Width: 24 inches   Depth: 21 inches   Seat: 21 inches   Seat Back: 20 inches

## 2020-04-16 NOTE — TELEPHONE ENCOUNTER
----- Message from Preeti Elmore sent at 4/16/2020 12:00 PM EDT -----  Regarding: WHEELCHAIR   HOME HEALTH CALLED BACK TO LET YOU KNOW THAT  WILL BE DEALING WITH WHEELCHAIR AND HANDLING THE MEASUREMENTS SO THERE IS NOTHING  NEEDS TO DO

## 2020-04-20 ENCOUNTER — LAB REQUISITION (OUTPATIENT)
Dept: LAB | Facility: HOSPITAL | Age: 75
End: 2020-04-20

## 2020-04-20 DIAGNOSIS — C92.40 ACUTE PROMYELOCYTIC LEUKEMIA, NOT HAVING ACHIEVED REMISSION (HCC): ICD-10-CM

## 2020-04-20 LAB
25(OH)D3 SERPL-MCNC: 23.6 NG/ML (ref 30–100)
ALBUMIN SERPL-MCNC: 2.7 G/DL (ref 3.5–5.2)
ALBUMIN/GLOB SERPL: 0.7 G/DL
ALP SERPL-CCNC: 89 U/L (ref 39–117)
ALT SERPL W P-5'-P-CCNC: 5 U/L (ref 1–41)
ANION GAP SERPL CALCULATED.3IONS-SCNC: 13 MMOL/L (ref 5–15)
AST SERPL-CCNC: 12 U/L (ref 1–40)
BASOPHILS # BLD MANUAL: 0 10*3/MM3 (ref 0–0.2)
BASOPHILS NFR BLD AUTO: 0 % (ref 0–1.5)
BILIRUB SERPL-MCNC: 1 MG/DL (ref 0.2–1.2)
BUN BLD-MCNC: 12 MG/DL (ref 8–23)
BUN/CREAT SERPL: 12.8 (ref 7–25)
CALCIUM SPEC-SCNC: 8 MG/DL (ref 8.6–10.5)
CHLORIDE SERPL-SCNC: 101 MMOL/L (ref 98–107)
CO2 SERPL-SCNC: 23 MMOL/L (ref 22–29)
CREAT BLD-MCNC: 0.94 MG/DL (ref 0.76–1.27)
DEPRECATED RDW RBC AUTO: 70.4 FL (ref 37–54)
EOSINOPHIL # BLD MANUAL: 0.38 10*3/MM3 (ref 0–0.4)
EOSINOPHIL NFR BLD MANUAL: 2 % (ref 0.3–6.2)
ERYTHROCYTE [DISTWIDTH] IN BLOOD BY AUTOMATED COUNT: 17.2 % (ref 12.3–15.4)
FERRITIN SERPL-MCNC: 3372 NG/ML (ref 30–400)
FOLATE SERPL-MCNC: 6.51 NG/ML (ref 4.78–24.2)
GFR SERPL CREATININE-BSD FRML MDRD: 78 ML/MIN/1.73
GLOBULIN UR ELPH-MCNC: 3.8 GM/DL
GLUCOSE BLD-MCNC: 78 MG/DL (ref 65–99)
HCT VFR BLD AUTO: 31 % (ref 37.5–51)
HGB BLD-MCNC: 8.9 G/DL (ref 13–17.7)
LYMPHOCYTES # BLD MANUAL: 2.81 10*3/MM3 (ref 0.7–3.1)
LYMPHOCYTES NFR BLD MANUAL: 15 % (ref 19.6–45.3)
LYMPHOCYTES NFR BLD MANUAL: 5 % (ref 5–12)
MCH RBC QN AUTO: 31.2 PG (ref 26.6–33)
MCHC RBC AUTO-ENTMCNC: 28.7 G/DL (ref 31.5–35.7)
MCV RBC AUTO: 108.8 FL (ref 79–97)
MONOCYTES # BLD AUTO: 0.94 10*3/MM3 (ref 0.1–0.9)
NEUTROPHILS # BLD AUTO: 14.63 10*3/MM3 (ref 1.7–7)
NEUTROPHILS NFR BLD MANUAL: 78 % (ref 42.7–76)
PLAT MORPH BLD: NORMAL
PLATELET # BLD AUTO: 288 10*3/MM3 (ref 140–450)
PMV BLD AUTO: 10.2 FL (ref 6–12)
POTASSIUM BLD-SCNC: 3.2 MMOL/L (ref 3.5–5.2)
PROT SERPL-MCNC: 6.5 G/DL (ref 6–8.5)
RBC # BLD AUTO: 2.85 10*6/MM3 (ref 4.14–5.8)
RBC MORPH BLD: NORMAL
SODIUM BLD-SCNC: 137 MMOL/L (ref 136–145)
WBC MORPH BLD: NORMAL
WBC NRBC COR # BLD: 18.75 10*3/MM3 (ref 3.4–10.8)

## 2020-04-20 PROCEDURE — 82746 ASSAY OF FOLIC ACID SERUM: CPT | Performed by: INTERNAL MEDICINE

## 2020-04-20 PROCEDURE — 82525 ASSAY OF COPPER: CPT | Performed by: INTERNAL MEDICINE

## 2020-04-20 PROCEDURE — 85007 BL SMEAR W/DIFF WBC COUNT: CPT | Performed by: INTERNAL MEDICINE

## 2020-04-20 PROCEDURE — 85027 COMPLETE CBC AUTOMATED: CPT | Performed by: INTERNAL MEDICINE

## 2020-04-20 PROCEDURE — 82608 B-12 BINDING CAPACITY: CPT | Performed by: INTERNAL MEDICINE

## 2020-04-20 PROCEDURE — 82728 ASSAY OF FERRITIN: CPT | Performed by: INTERNAL MEDICINE

## 2020-04-20 PROCEDURE — 80053 COMPREHEN METABOLIC PANEL: CPT | Performed by: INTERNAL MEDICINE

## 2020-04-20 PROCEDURE — 82306 VITAMIN D 25 HYDROXY: CPT | Performed by: INTERNAL MEDICINE

## 2020-04-21 ENCOUNTER — TELEPHONE (OUTPATIENT)
Dept: FAMILY MEDICINE CLINIC | Facility: CLINIC | Age: 75
End: 2020-04-21

## 2020-04-21 DIAGNOSIS — R19.7 DIARRHEA OF PRESUMED INFECTIOUS ORIGIN: Primary | ICD-10-CM

## 2020-04-21 NOTE — TELEPHONE ENCOUNTER
spoke with wife who reports her  has had wattery diarrhea since last night.  he is bedridden and she reports a low grade fever of 99.9.  he has had hx of c.diff and was hospitalized from 1/2020 until 2 weeks ago at  for Leukemia.  she is asking if she can give him immodium.  I have advised against immodium as that can worsen the infection and prolong the duration of illness.  I have placed order for c.diff to  containers in morning and try to obtains stool sample.  In meantime should he dev bloody stool or abd pain she can either call back POC for further guidance or take him to ED to make sure he does not have diverticulitis.

## 2020-04-23 ENCOUNTER — TRANSCRIBE ORDERS (OUTPATIENT)
Dept: LAB | Facility: HOSPITAL | Age: 75
End: 2020-04-23

## 2020-04-23 ENCOUNTER — LAB (OUTPATIENT)
Dept: LAB | Facility: HOSPITAL | Age: 75
End: 2020-04-23

## 2020-04-23 DIAGNOSIS — C92.42: Primary | ICD-10-CM

## 2020-04-23 DIAGNOSIS — C92.42: ICD-10-CM

## 2020-04-23 LAB
ALBUMIN SERPL-MCNC: 2.2 G/DL (ref 3.5–5.2)
ALBUMIN/GLOB SERPL: 0.6 G/DL
ALP SERPL-CCNC: 75 U/L (ref 39–117)
ALT SERPL W P-5'-P-CCNC: 6 U/L (ref 1–41)
ANION GAP SERPL CALCULATED.3IONS-SCNC: 12.6 MMOL/L (ref 5–15)
AST SERPL-CCNC: 9 U/L (ref 1–40)
BASOPHILS # BLD AUTO: 0.05 10*3/MM3 (ref 0–0.2)
BASOPHILS NFR BLD AUTO: 0.4 % (ref 0–1.5)
BILIRUB SERPL-MCNC: 0.9 MG/DL (ref 0.2–1.2)
BUN BLD-MCNC: 8 MG/DL (ref 8–23)
BUN/CREAT SERPL: 9.9 (ref 7–25)
CALCIUM SPEC-SCNC: 7.9 MG/DL (ref 8.6–10.5)
CHLORIDE SERPL-SCNC: 98 MMOL/L (ref 98–107)
CO2 SERPL-SCNC: 23.4 MMOL/L (ref 22–29)
COPPER SERPL-MCNC: 120 UG/DL (ref 72–166)
CREAT BLD-MCNC: 0.81 MG/DL (ref 0.76–1.27)
DEPRECATED RDW RBC AUTO: 53.5 FL (ref 37–54)
EOSINOPHIL # BLD AUTO: 0.23 10*3/MM3 (ref 0–0.4)
EOSINOPHIL NFR BLD AUTO: 1.8 % (ref 0.3–6.2)
ERYTHROCYTE [DISTWIDTH] IN BLOOD BY AUTOMATED COUNT: 15 % (ref 12.3–15.4)
GFR SERPL CREATININE-BSD FRML MDRD: 92 ML/MIN/1.73
GLOBULIN UR ELPH-MCNC: 3.9 GM/DL
GLUCOSE BLD-MCNC: 90 MG/DL (ref 65–99)
HCT VFR BLD AUTO: 23.9 % (ref 37.5–51)
HGB BLD-MCNC: 7.5 G/DL (ref 13–17.7)
IMM GRANULOCYTES # BLD AUTO: 0.1 10*3/MM3 (ref 0–0.05)
IMM GRANULOCYTES NFR BLD AUTO: 0.8 % (ref 0–0.5)
LYMPHOCYTES # BLD AUTO: 2.4 10*3/MM3 (ref 0.7–3.1)
LYMPHOCYTES NFR BLD AUTO: 19.2 % (ref 19.6–45.3)
MCH RBC QN AUTO: 30.6 PG (ref 26.6–33)
MCHC RBC AUTO-ENTMCNC: 31.4 G/DL (ref 31.5–35.7)
MCV RBC AUTO: 97.6 FL (ref 79–97)
MONOCYTES # BLD AUTO: 0.91 10*3/MM3 (ref 0.1–0.9)
MONOCYTES NFR BLD AUTO: 7.3 % (ref 5–12)
NEUTROPHILS # BLD AUTO: 8.79 10*3/MM3 (ref 1.7–7)
NEUTROPHILS NFR BLD AUTO: 70.5 % (ref 42.7–76)
NRBC BLD AUTO-RTO: 0.1 /100 WBC (ref 0–0.2)
PLATELET # BLD AUTO: 358 10*3/MM3 (ref 140–450)
PMV BLD AUTO: 10.3 FL (ref 6–12)
POTASSIUM BLD-SCNC: 3.2 MMOL/L (ref 3.5–5.2)
PROT SERPL-MCNC: 6.1 G/DL (ref 6–8.5)
RBC # BLD AUTO: 2.45 10*6/MM3 (ref 4.14–5.8)
SODIUM BLD-SCNC: 134 MMOL/L (ref 136–145)
VIT B12 USB SERPL-MCNC: 1514 PG/ML (ref 725–2045)
WBC NRBC COR # BLD: 12.48 10*3/MM3 (ref 3.4–10.8)

## 2020-04-23 PROCEDURE — 80053 COMPREHEN METABOLIC PANEL: CPT

## 2020-04-23 PROCEDURE — 36415 COLL VENOUS BLD VENIPUNCTURE: CPT

## 2020-04-23 PROCEDURE — 85025 COMPLETE CBC W/AUTO DIFF WBC: CPT

## 2020-04-27 ENCOUNTER — LAB REQUISITION (OUTPATIENT)
Dept: LAB | Facility: HOSPITAL | Age: 75
End: 2020-04-27

## 2020-04-27 DIAGNOSIS — C92.40 ACUTE PROMYELOCYTIC LEUKEMIA, NOT HAVING ACHIEVED REMISSION (HCC): ICD-10-CM

## 2020-04-27 LAB
ALBUMIN SERPL-MCNC: 2.6 G/DL (ref 3.5–5.2)
ALBUMIN/GLOB SERPL: 0.7 G/DL
ALP SERPL-CCNC: 83 U/L (ref 39–117)
ALT SERPL W P-5'-P-CCNC: 7 U/L (ref 1–41)
ANION GAP SERPL CALCULATED.3IONS-SCNC: 15.2 MMOL/L (ref 5–15)
AST SERPL-CCNC: 15 U/L (ref 1–40)
BASOPHILS # BLD AUTO: 0.07 10*3/MM3 (ref 0–0.2)
BASOPHILS NFR BLD AUTO: 0.4 % (ref 0–1.5)
BILIRUB SERPL-MCNC: 0.8 MG/DL (ref 0.2–1.2)
BUN BLD-MCNC: 12 MG/DL (ref 8–23)
BUN/CREAT SERPL: 15 (ref 7–25)
CALCIUM SPEC-SCNC: 8.1 MG/DL (ref 8.6–10.5)
CHLORIDE SERPL-SCNC: 95 MMOL/L (ref 98–107)
CO2 SERPL-SCNC: 23.8 MMOL/L (ref 22–29)
CREAT BLD-MCNC: 0.8 MG/DL (ref 0.76–1.27)
DEPRECATED RDW RBC AUTO: 65 FL (ref 37–54)
EOSINOPHIL # BLD AUTO: 0.14 10*3/MM3 (ref 0–0.4)
EOSINOPHIL NFR BLD AUTO: 0.8 % (ref 0.3–6.2)
ERYTHROCYTE [DISTWIDTH] IN BLOOD BY AUTOMATED COUNT: 17.2 % (ref 12.3–15.4)
GFR SERPL CREATININE-BSD FRML MDRD: 94 ML/MIN/1.73
GLOBULIN UR ELPH-MCNC: 3.8 GM/DL
GLUCOSE BLD-MCNC: 102 MG/DL (ref 65–99)
HCT VFR BLD AUTO: 26.8 % (ref 37.5–51)
HGB BLD-MCNC: 7.8 G/DL (ref 13–17.7)
IMM GRANULOCYTES # BLD AUTO: 0.14 10*3/MM3 (ref 0–0.05)
IMM GRANULOCYTES NFR BLD AUTO: 0.8 % (ref 0–0.5)
LYMPHOCYTES # BLD AUTO: 2.43 10*3/MM3 (ref 0.7–3.1)
LYMPHOCYTES NFR BLD AUTO: 13 % (ref 19.6–45.3)
MCH RBC QN AUTO: 30.2 PG (ref 26.6–33)
MCHC RBC AUTO-ENTMCNC: 29.1 G/DL (ref 31.5–35.7)
MCV RBC AUTO: 103.9 FL (ref 79–97)
MONOCYTES # BLD AUTO: 1.32 10*3/MM3 (ref 0.1–0.9)
MONOCYTES NFR BLD AUTO: 7.1 % (ref 5–12)
NEUTROPHILS # BLD AUTO: 14.54 10*3/MM3 (ref 1.7–7)
NEUTROPHILS NFR BLD AUTO: 77.9 % (ref 42.7–76)
NRBC BLD AUTO-RTO: 0.2 /100 WBC (ref 0–0.2)
PLATELET # BLD AUTO: 426 10*3/MM3 (ref 140–450)
PMV BLD AUTO: 10.2 FL (ref 6–12)
POLYCHROMASIA BLD QL SMEAR: NORMAL
POTASSIUM BLD-SCNC: 2.9 MMOL/L (ref 3.5–5.2)
PROT SERPL-MCNC: 6.4 G/DL (ref 6–8.5)
RBC # BLD AUTO: 2.58 10*6/MM3 (ref 4.14–5.8)
SMALL PLATELETS BLD QL SMEAR: NORMAL
SODIUM BLD-SCNC: 134 MMOL/L (ref 136–145)
WBC MORPH BLD: NORMAL
WBC NRBC COR # BLD: 18.64 10*3/MM3 (ref 3.4–10.8)

## 2020-04-27 PROCEDURE — 85025 COMPLETE CBC W/AUTO DIFF WBC: CPT | Performed by: INTERNAL MEDICINE

## 2020-04-27 PROCEDURE — 85007 BL SMEAR W/DIFF WBC COUNT: CPT | Performed by: INTERNAL MEDICINE

## 2020-04-27 PROCEDURE — 80053 COMPREHEN METABOLIC PANEL: CPT | Performed by: INTERNAL MEDICINE

## 2020-04-29 ENCOUNTER — LAB REQUISITION (OUTPATIENT)
Dept: LAB | Facility: HOSPITAL | Age: 75
End: 2020-04-29

## 2020-04-29 DIAGNOSIS — C92.40 ACUTE PROMYELOCYTIC LEUKEMIA, NOT HAVING ACHIEVED REMISSION (HCC): ICD-10-CM

## 2020-04-29 LAB
ALBUMIN SERPL-MCNC: 2.7 G/DL (ref 3.5–5.2)
ALBUMIN/GLOB SERPL: 0.6 G/DL
ALP SERPL-CCNC: 95 U/L (ref 39–117)
ALT SERPL W P-5'-P-CCNC: 18 U/L (ref 1–41)
ANION GAP SERPL CALCULATED.3IONS-SCNC: 14.1 MMOL/L (ref 5–15)
ANISOCYTOSIS BLD QL: NORMAL
AST SERPL-CCNC: 37 U/L (ref 1–40)
BASOPHILS # BLD AUTO: 0.07 10*3/MM3 (ref 0–0.2)
BASOPHILS NFR BLD AUTO: 0.6 % (ref 0–1.5)
BILIRUB SERPL-MCNC: 0.8 MG/DL (ref 0.2–1.2)
BUN BLD-MCNC: 11 MG/DL (ref 8–23)
BUN/CREAT SERPL: 12.9 (ref 7–25)
CALCIUM SPEC-SCNC: 8.8 MG/DL (ref 8.6–10.5)
CHLORIDE SERPL-SCNC: 98 MMOL/L (ref 98–107)
CO2 SERPL-SCNC: 24.9 MMOL/L (ref 22–29)
CREAT BLD-MCNC: 0.85 MG/DL (ref 0.76–1.27)
DEPRECATED RDW RBC AUTO: 64.9 FL (ref 37–54)
EOSINOPHIL # BLD AUTO: 0.19 10*3/MM3 (ref 0–0.4)
EOSINOPHIL NFR BLD AUTO: 1.5 % (ref 0.3–6.2)
ERYTHROCYTE [DISTWIDTH] IN BLOOD BY AUTOMATED COUNT: 17 % (ref 12.3–15.4)
GFR SERPL CREATININE-BSD FRML MDRD: 87 ML/MIN/1.73
GLOBULIN UR ELPH-MCNC: 4.5 GM/DL
GLUCOSE BLD-MCNC: 103 MG/DL (ref 65–99)
HCT VFR BLD AUTO: 28.9 % (ref 37.5–51)
HGB BLD-MCNC: 8.3 G/DL (ref 13–17.7)
HYPOCHROMIA BLD QL: NORMAL
IMM GRANULOCYTES # BLD AUTO: 0.1 10*3/MM3 (ref 0–0.05)
IMM GRANULOCYTES NFR BLD AUTO: 0.8 % (ref 0–0.5)
LYMPHOCYTES # BLD AUTO: 1.71 10*3/MM3 (ref 0.7–3.1)
LYMPHOCYTES NFR BLD AUTO: 13.6 % (ref 19.6–45.3)
MACROCYTES BLD QL SMEAR: NORMAL
MCH RBC QN AUTO: 30 PG (ref 26.6–33)
MCHC RBC AUTO-ENTMCNC: 28.7 G/DL (ref 31.5–35.7)
MCV RBC AUTO: 104.3 FL (ref 79–97)
MONOCYTES # BLD AUTO: 0.96 10*3/MM3 (ref 0.1–0.9)
MONOCYTES NFR BLD AUTO: 7.6 % (ref 5–12)
NEUTROPHILS # BLD AUTO: 9.52 10*3/MM3 (ref 1.7–7)
NEUTROPHILS NFR BLD AUTO: 75.9 % (ref 42.7–76)
NRBC BLD AUTO-RTO: 0.2 /100 WBC (ref 0–0.2)
PLATELET # BLD AUTO: 495 10*3/MM3 (ref 140–450)
PMV BLD AUTO: 10.1 FL (ref 6–12)
POLYCHROMASIA BLD QL SMEAR: NORMAL
POTASSIUM BLD-SCNC: 3.7 MMOL/L (ref 3.5–5.2)
PROT SERPL-MCNC: 7.2 G/DL (ref 6–8.5)
RBC # BLD AUTO: 2.77 10*6/MM3 (ref 4.14–5.8)
SMALL PLATELETS BLD QL SMEAR: NORMAL
SODIUM BLD-SCNC: 137 MMOL/L (ref 136–145)
WBC MORPH BLD: NORMAL
WBC NRBC COR # BLD: 12.55 10*3/MM3 (ref 3.4–10.8)

## 2020-04-29 PROCEDURE — 85025 COMPLETE CBC W/AUTO DIFF WBC: CPT | Performed by: INTERNAL MEDICINE

## 2020-04-29 PROCEDURE — 85007 BL SMEAR W/DIFF WBC COUNT: CPT | Performed by: INTERNAL MEDICINE

## 2020-04-29 PROCEDURE — 80053 COMPREHEN METABOLIC PANEL: CPT | Performed by: INTERNAL MEDICINE

## 2020-05-20 ENCOUNTER — TELEPHONE (OUTPATIENT)
Dept: FAMILY MEDICINE CLINIC | Facility: CLINIC | Age: 75
End: 2020-05-20

## 2020-05-20 NOTE — TELEPHONE ENCOUNTER
SHARON CALLED FROM [facility], OCCUP THERAPIST,   WOULD LIKE TO SEE PATIENT 2X FOR 4 WEEKS; SHE CAN TAKE A VERBAL ORDER    [phone] MELISSA

## 2020-06-16 ENCOUNTER — TELEPHONE (OUTPATIENT)
Dept: FAMILY MEDICINE CLINIC | Facility: CLINIC | Age: 75
End: 2020-06-16

## 2020-06-16 NOTE — TELEPHONE ENCOUNTER
Yojana from [facility] wants to extend his OT to 2 times a week for 4 weeks.  She can be reached at [phone]

## 2020-06-17 NOTE — TELEPHONE ENCOUNTER
Spoke with Yojana steiner/[facility] and gave verbal consent to extend OT.  BBelizabeth, JOSE JUAN

## 2020-07-14 ENCOUNTER — TELEPHONE (OUTPATIENT)
Dept: FAMILY MEDICINE CLINIC | Facility: CLINIC | Age: 75
End: 2020-07-14

## 2020-07-14 NOTE — TELEPHONE ENCOUNTER
MELISSA WITH [employer] CALLED TO  EXTEND OT FOR 4 WEEKS / TWICE A WEEK . A VERBAL IS OK     SHARON CALL BACK [phone]

## 2020-07-15 ENCOUNTER — LAB REQUISITION (OUTPATIENT)
Dept: LAB | Facility: HOSPITAL | Age: 75
End: 2020-07-15

## 2020-07-15 DIAGNOSIS — Z00.00 ROUTINE GENERAL MEDICAL EXAMINATION AT A HEALTH CARE FACILITY: ICD-10-CM

## 2020-07-15 LAB
ALBUMIN SERPL-MCNC: 3 G/DL (ref 3.5–5.2)
ALBUMIN/GLOB SERPL: 0.9 G/DL
ALP SERPL-CCNC: 82 U/L (ref 39–117)
ALT SERPL W P-5'-P-CCNC: 13 U/L (ref 1–41)
ANION GAP SERPL CALCULATED.3IONS-SCNC: 10 MMOL/L (ref 5–15)
AST SERPL-CCNC: 19 U/L (ref 1–40)
BASOPHILS # BLD AUTO: 0.06 10*3/MM3 (ref 0–0.2)
BASOPHILS NFR BLD AUTO: 0.7 % (ref 0–1.5)
BILIRUB SERPL-MCNC: 0.3 MG/DL (ref 0–1.2)
BUN SERPL-MCNC: 27 MG/DL (ref 8–23)
BUN/CREAT SERPL: 25.5 (ref 7–25)
CALCIUM SPEC-SCNC: 9 MG/DL (ref 8.6–10.5)
CHLORIDE SERPL-SCNC: 101 MMOL/L (ref 98–107)
CO2 SERPL-SCNC: 27 MMOL/L (ref 22–29)
CREAT SERPL-MCNC: 1.06 MG/DL (ref 0.76–1.27)
DEPRECATED RDW RBC AUTO: 55.9 FL (ref 37–54)
EOSINOPHIL # BLD AUTO: 0.5 10*3/MM3 (ref 0–0.4)
EOSINOPHIL NFR BLD AUTO: 5.6 % (ref 0.3–6.2)
ERYTHROCYTE [DISTWIDTH] IN BLOOD BY AUTOMATED COUNT: 15.9 % (ref 12.3–15.4)
GFR SERPL CREATININE-BSD FRML MDRD: 68 ML/MIN/1.73
GLOBULIN UR ELPH-MCNC: 3.5 GM/DL
GLUCOSE SERPL-MCNC: 77 MG/DL (ref 65–99)
HCT VFR BLD AUTO: 36 % (ref 37.5–51)
HGB BLD-MCNC: 10.8 G/DL (ref 13–17.7)
IMM GRANULOCYTES # BLD AUTO: 0.04 10*3/MM3 (ref 0–0.05)
IMM GRANULOCYTES NFR BLD AUTO: 0.5 % (ref 0–0.5)
LYMPHOCYTES # BLD AUTO: 2.71 10*3/MM3 (ref 0.7–3.1)
LYMPHOCYTES NFR BLD AUTO: 30.6 % (ref 19.6–45.3)
MCH RBC QN AUTO: 28.8 PG (ref 26.6–33)
MCHC RBC AUTO-ENTMCNC: 30 G/DL (ref 31.5–35.7)
MCV RBC AUTO: 96 FL (ref 79–97)
MONOCYTES # BLD AUTO: 0.66 10*3/MM3 (ref 0.1–0.9)
MONOCYTES NFR BLD AUTO: 7.5 % (ref 5–12)
NEUTROPHILS NFR BLD AUTO: 4.88 10*3/MM3 (ref 1.7–7)
NEUTROPHILS NFR BLD AUTO: 55.1 % (ref 42.7–76)
NRBC BLD AUTO-RTO: 0 /100 WBC (ref 0–0.2)
PLATELET # BLD AUTO: 294 10*3/MM3 (ref 140–450)
PMV BLD AUTO: 10.1 FL (ref 6–12)
POTASSIUM SERPL-SCNC: 4.3 MMOL/L (ref 3.5–5.2)
PROT SERPL-MCNC: 6.5 G/DL (ref 6–8.5)
RBC # BLD AUTO: 3.75 10*6/MM3 (ref 4.14–5.8)
SODIUM SERPL-SCNC: 138 MMOL/L (ref 136–145)
WBC # BLD AUTO: 8.85 10*3/MM3 (ref 3.4–10.8)

## 2020-07-15 PROCEDURE — 80053 COMPREHEN METABOLIC PANEL: CPT

## 2020-07-15 PROCEDURE — 85025 COMPLETE CBC W/AUTO DIFF WBC: CPT

## 2020-07-27 ENCOUNTER — LAB REQUISITION (OUTPATIENT)
Dept: LAB | Facility: HOSPITAL | Age: 75
End: 2020-07-27

## 2020-07-27 DIAGNOSIS — Z00.00 ROUTINE GENERAL MEDICAL EXAMINATION AT A HEALTH CARE FACILITY: ICD-10-CM

## 2020-07-27 LAB
ALBUMIN SERPL-MCNC: 3.4 G/DL (ref 3.5–5.2)
ALBUMIN/GLOB SERPL: 1 G/DL
ALP SERPL-CCNC: 77 U/L (ref 39–117)
ALT SERPL W P-5'-P-CCNC: 21 U/L (ref 1–41)
ANION GAP SERPL CALCULATED.3IONS-SCNC: 9 MMOL/L (ref 5–15)
AST SERPL-CCNC: 23 U/L (ref 1–40)
BASOPHILS # BLD AUTO: 0.04 10*3/MM3 (ref 0–0.2)
BASOPHILS NFR BLD AUTO: 0.5 % (ref 0–1.5)
BILIRUB SERPL-MCNC: 0.4 MG/DL (ref 0–1.2)
BUN SERPL-MCNC: 28 MG/DL (ref 8–23)
BUN/CREAT SERPL: 28.6 (ref 7–25)
CALCIUM SPEC-SCNC: 9.2 MG/DL (ref 8.6–10.5)
CHLORIDE SERPL-SCNC: 101 MMOL/L (ref 98–107)
CO2 SERPL-SCNC: 27 MMOL/L (ref 22–29)
CREAT SERPL-MCNC: 0.98 MG/DL (ref 0.76–1.27)
DEPRECATED RDW RBC AUTO: 56.8 FL (ref 37–54)
EOSINOPHIL # BLD AUTO: 0.43 10*3/MM3 (ref 0–0.4)
EOSINOPHIL NFR BLD AUTO: 4.9 % (ref 0.3–6.2)
ERYTHROCYTE [DISTWIDTH] IN BLOOD BY AUTOMATED COUNT: 15.9 % (ref 12.3–15.4)
GFR SERPL CREATININE-BSD FRML MDRD: 74 ML/MIN/1.73
GLOBULIN UR ELPH-MCNC: 3.3 GM/DL
GLUCOSE SERPL-MCNC: 74 MG/DL (ref 65–99)
HCT VFR BLD AUTO: 36.9 % (ref 37.5–51)
HGB BLD-MCNC: 11.2 G/DL (ref 13–17.7)
IMM GRANULOCYTES # BLD AUTO: 0.03 10*3/MM3 (ref 0–0.05)
IMM GRANULOCYTES NFR BLD AUTO: 0.3 % (ref 0–0.5)
LYMPHOCYTES # BLD AUTO: 2.27 10*3/MM3 (ref 0.7–3.1)
LYMPHOCYTES NFR BLD AUTO: 26.1 % (ref 19.6–45.3)
MCH RBC QN AUTO: 29.7 PG (ref 26.6–33)
MCHC RBC AUTO-ENTMCNC: 30.4 G/DL (ref 31.5–35.7)
MCV RBC AUTO: 97.9 FL (ref 79–97)
MONOCYTES # BLD AUTO: 0.7 10*3/MM3 (ref 0.1–0.9)
MONOCYTES NFR BLD AUTO: 8 % (ref 5–12)
NEUTROPHILS NFR BLD AUTO: 5.23 10*3/MM3 (ref 1.7–7)
NEUTROPHILS NFR BLD AUTO: 60.2 % (ref 42.7–76)
NRBC BLD AUTO-RTO: 0 /100 WBC (ref 0–0.2)
PLATELET # BLD AUTO: 246 10*3/MM3 (ref 140–450)
PMV BLD AUTO: 10.6 FL (ref 6–12)
POTASSIUM SERPL-SCNC: 5 MMOL/L (ref 3.5–5.2)
PROT SERPL-MCNC: 6.7 G/DL (ref 6–8.5)
RBC # BLD AUTO: 3.77 10*6/MM3 (ref 4.14–5.8)
SODIUM SERPL-SCNC: 137 MMOL/L (ref 136–145)
WBC # BLD AUTO: 8.7 10*3/MM3 (ref 3.4–10.8)

## 2020-07-27 PROCEDURE — 80053 COMPREHEN METABOLIC PANEL: CPT | Performed by: INTERNAL MEDICINE

## 2020-07-27 PROCEDURE — 85025 COMPLETE CBC W/AUTO DIFF WBC: CPT | Performed by: INTERNAL MEDICINE

## 2020-09-22 ENCOUNTER — TELEPHONE (OUTPATIENT)
Dept: FAMILY MEDICINE CLINIC | Facility: CLINIC | Age: 75
End: 2020-09-22

## 2020-09-22 NOTE — TELEPHONE ENCOUNTER
Caller: [facility]    Relationship to patient: MEDICAL PROVIDER    Best call back number: [phone]    New or established patient?  [] New  [x] Established    Date of discharge: 9/25/2020    [facility] from: [facility]    Diagnosis/Symptoms: ATRA AND CONSOLIDATION THERAPY    Length of stay (If applicable): 27 DAYS

## 2020-09-27 ENCOUNTER — READMISSION MANAGEMENT (OUTPATIENT)
Dept: CALL CENTER | Facility: HOSPITAL | Age: 75
End: 2020-09-27

## 2020-09-27 NOTE — OUTREACH NOTE
Prep Survey      Responses   [facility] facility patient discharged from?  Non-BH   Is LACE score < 7 ?  Non-BH Discharge   Eligibility  [facility]   Date of Discharge  09/25/20   Discharge Disposition  Home-Health Care [facility]   Discharge diagnosis  unknown   Does the patient have one of the following disease processes/diagnoses(primary or secondary)?  Other   Does the patient have Home health ordered?  Yes   What is the Home health agency?   plan Home - with Home Health Services   Prep survey completed?  Yes          Consuelo Jackson RN

## 2020-09-28 ENCOUNTER — TELEPHONE (OUTPATIENT)
Dept: FAMILY MEDICINE CLINIC | Facility: CLINIC | Age: 75
End: 2020-09-28

## 2020-09-28 ENCOUNTER — TRANSITIONAL CARE MANAGEMENT TELEPHONE ENCOUNTER (OUTPATIENT)
Dept: CALL CENTER | Facility: HOSPITAL | Age: 75
End: 2020-09-28

## 2020-09-28 NOTE — TELEPHONE ENCOUNTER
MELISSA FROM [facility] CALLED. SHE RESTARTED THE PATIENT IN OCCUPATIONAL THERAPY. SHE WOULD LIKE TO SEE HIM ONCE A WEEK FOR 4 WEEKS. PLEASE ADVISE.     CONTACT: [phone]

## 2020-09-28 NOTE — OUTREACH NOTE
Call Center TCM Note      Responses   [facility] patient discharged from?  Non- [[facility]]   COVID-19 Test Status  Not tested   Does the patient have one of the following disease processes/diagnoses(primary or secondary)?  Other   TCM attempt successful?  Yes   Call start time  1602   Call end time  1615   Discharge diagnosis  weakness   Is patient permission given to speak with other caregiver?  Yes   List who call center can speak with  Ruthie Hudson, spouse   Person spoke with today (if not patient) and relationship  Ruthie Becca, spouse   Meds reviewed with patient/caregiver?  Yes   Is the patient having any side effects they believe may be caused by any medication additions or changes?  No   Does the patient have all medications ordered at discharge?  Yes   Is the patient taking all medications as directed (includes completed medication regime)?  Yes   Does the patient have a primary care provider?   Yes   Does the patient have an appointment with their PCP within 7 days of discharge?  No   Comments regarding PCP  PCP Dr Blair Fermin. Wife declines PCP follow up at this time. She asked for previously scheduled appt to be cancelled.    Nursing Interventions  -- [Encouraged telemedicine. ]   Has the patient kept scheduled appointments due by today?  N/A   Comments  Wife reports that patient follows with Dr Marin for cardiology   What is the Home health agency?   [facility].    Has home health visited the patient within 72 hours of discharge?  Yes   Pulse Ox monitoring  None   Psychosocial issues?  No   Did the patient receive a copy of their discharge instructions?  Yes   Nursing interventions  Reviewed instructions with patient, Educated on MyChart   What is the patient's perception of their health status since discharge?  Improving   Is the patient/caregiver able to teach back signs and symptoms related to disease process for when to call PCP?  Yes   Is the patient/caregiver able to teach back  signs and symptoms related to disease process for when to call 911?  Yes   Is the patient/caregiver able to teach back the hierarchy of who to call/visit for symptoms/problems? PCP, Specialist, Home health nurse, Urgent Care, ED, 911  Yes   TCM call completed?  Yes          Ruthie Odom RN    9/28/2020, 16:15 EDT

## 2020-10-15 ENCOUNTER — OFFICE VISIT (OUTPATIENT)
Dept: FAMILY MEDICINE CLINIC | Facility: CLINIC | Age: 75
End: 2020-10-15

## 2020-10-15 VITALS
WEIGHT: 197.8 LBS | BODY MASS INDEX: 26.79 KG/M2 | TEMPERATURE: 98 F | HEART RATE: 71 BPM | HEIGHT: 72 IN | SYSTOLIC BLOOD PRESSURE: 102 MMHG | DIASTOLIC BLOOD PRESSURE: 58 MMHG | OXYGEN SATURATION: 88 %

## 2020-10-15 DIAGNOSIS — R29.898 WEAKNESS OF BOTH LOWER EXTREMITIES: Primary | ICD-10-CM

## 2020-10-15 DIAGNOSIS — Z23 NEED FOR IMMUNIZATION AGAINST INFLUENZA: ICD-10-CM

## 2020-10-15 PROCEDURE — 90694 VACC AIIV4 NO PRSRV 0.5ML IM: CPT | Performed by: FAMILY MEDICINE

## 2020-10-15 PROCEDURE — 99213 OFFICE O/P EST LOW 20 MIN: CPT | Performed by: FAMILY MEDICINE

## 2020-10-15 PROCEDURE — G0008 ADMIN INFLUENZA VIRUS VAC: HCPCS | Performed by: FAMILY MEDICINE

## 2020-10-15 RX ORDER — METOPROLOL TARTRATE 37.5 MG/1
37.5 TABLET, FILM COATED ORAL
COMMUNITY
Start: 2020-09-23 | End: 2021-05-19 | Stop reason: SDUPTHER

## 2020-10-15 RX ORDER — PROCHLORPERAZINE MALEATE 10 MG
10 TABLET ORAL 2 TIMES DAILY PRN
COMMUNITY
Start: 2020-09-23 | End: 2021-05-19

## 2020-10-15 RX ORDER — DOCUSATE SODIUM 100 MG/1
100 CAPSULE, LIQUID FILLED ORAL DAILY
COMMUNITY
Start: 2020-09-23

## 2020-10-15 NOTE — PROGRESS NOTES
"Subjective   Rylan Hudson is a 75 y.o. male seen today for Follow-up (follow up from rehabilitation ).     History of Present Illness   The patient is here today for a follow up on lower extremity weakness.    States he has been in rehab at [facility] about 4 weeks for strengthening and balance. States when he started rehab he could not walk more than 3-4 steps. When he left he was walking with a walker. Still some weakness in his leg with standing but once he is up he is ok with walking with the walker. He is using a stationary pedal elliptical that he uses twice a day.  States he has felt better today than he has in 5 years.    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   Sees Dr Phan once a month through telehealth.    Denies any chest pain or shortness of breath.  BP today is 102/58.  O 2 sat is 88% on arrival but is now at 99%.      The following portions of the patient's history were reviewed and updated as appropriate: allergies, current medications, past social history and problem list.    Review of Systems   Constitutional: Negative for appetite change, chills and fever.   Respiratory: Negative for shortness of breath.    Cardiovascular: Positive for leg swelling (lower legs). Negative for chest pain.   Neurological: Positive for weakness (legs).       Objective   /58   Pulse 71   Temp 98 °F (36.7 °C)   Ht 182.9 cm (72\")   Wt 89.7 kg (197 lb 12.8 oz)   SpO2 (!) 88%   BMI 26.83 kg/m²   Physical Exam  Vitals signs and nursing note reviewed.   Constitutional:       Appearance: Normal appearance.   Musculoskeletal:         General: No tenderness.      Right lower leg: Edema (2 plus up to about mid calf) present.      Left lower leg: Edema (2 plus up to about mid calf) present.   Neurological:      Mental Status: He is alert.         Assessment/Plan   Problems Addressed this Visit     None      Visit Diagnoses     Weakness of both lower extremities    -  Primary    Need for immunization " against influenza        Relevant Orders    Fluad Quad >65 years (Completed)      Diagnoses       Codes Comments    Weakness of both lower extremities    -  Primary ICD-10-CM: R29.898  ICD-9-CM: 729.89     Need for immunization against influenza     ICD-10-CM: Z23  ICD-9-CM: V04.81               Drink plenty fluids.    Continue medications as doing.    See Dr Young and Dr Phan as scheduled.    Follow up as needed.          Scribed for Dr Blair Fermin by Luh Early CMA.          I, Blair Fermin MD, personally performed the services described in this documentation, as scribed by Luh Early in my presence, and is both accurate and complete.        (Please note that portions of this note were completed with a voice recognition program. Efforts were made to edit the dictations,but occasionally words are mis transcribed.)

## 2020-11-23 ENCOUNTER — TELEPHONE (OUTPATIENT)
Dept: FAMILY MEDICINE CLINIC | Facility: CLINIC | Age: 75
End: 2020-11-23

## 2020-11-23 NOTE — TELEPHONE ENCOUNTER
PATIENT'S WIFE CALLED AND PATIENT WOULD LIKE TO KNOW IF HE CAN DRIVE?  WAS TOLD BY PHYSICAL THERAPIST THAT DOCTOR NEEDS TO GIVE THAT CLEARANCE.     PLEASE CALL AND ADVISE: [phone]

## 2020-11-23 NOTE — TELEPHONE ENCOUNTER
Note has been written and mailed to pt's home address.  Note scanned to pt's chart.  JOSE JUAN Hull    I spoke to the patient on the telephone as well as to his wife.  Both are agreed that he is returned to his previous state of function.  He is steady on his feet and has good coordination.  He is alert and oriented and is very sensible in his limitations.  I believe he is fully capable of driving safely at this time.

## 2021-05-19 ENCOUNTER — OFFICE VISIT (OUTPATIENT)
Dept: FAMILY MEDICINE CLINIC | Facility: CLINIC | Age: 76
End: 2021-05-19

## 2021-05-19 DIAGNOSIS — Z00.00 ENCOUNTER FOR SUBSEQUENT ANNUAL WELLNESS VISIT (AWV) IN MEDICARE PATIENT: Primary | ICD-10-CM

## 2021-05-19 PROBLEM — G47.00 CANNOT SLEEP: Status: ACTIVE | Noted: 2021-05-19

## 2021-05-19 PROBLEM — Z74.09 IMPAIRED MOBILITY: Status: ACTIVE | Noted: 2021-05-19

## 2021-05-19 PROBLEM — Z91.89 AT RISK FOR VENOUS THROMBOEMBOLISM (VTE): Status: ACTIVE | Noted: 2020-08-28

## 2021-05-19 PROBLEM — C92.41 ACUTE PROMYELOCYTIC LEUKEMIA IN REMISSION (HCC): Status: ACTIVE | Noted: 2020-07-20

## 2021-05-19 PROBLEM — R41.89 IMPAIRED COGNITION: Status: ACTIVE | Noted: 2021-05-19

## 2021-05-19 PROCEDURE — G0439 PPPS, SUBSEQ VISIT: HCPCS | Performed by: FAMILY MEDICINE

## 2021-05-19 PROCEDURE — 1126F AMNT PAIN NOTED NONE PRSNT: CPT | Performed by: FAMILY MEDICINE

## 2021-05-19 PROCEDURE — 1159F MED LIST DOCD IN RCRD: CPT | Performed by: FAMILY MEDICINE

## 2021-05-19 PROCEDURE — 1170F FXNL STATUS ASSESSED: CPT | Performed by: FAMILY MEDICINE

## 2021-05-19 PROCEDURE — 96160 PT-FOCUSED HLTH RISK ASSMT: CPT | Performed by: FAMILY MEDICINE

## 2021-05-19 RX ORDER — METOPROLOL TARTRATE 37.5 MG/1
37.5 TABLET, FILM COATED ORAL 3 TIMES DAILY
Qty: 90 TABLET | Refills: 5 | Status: SHIPPED | OUTPATIENT
Start: 2021-05-19

## 2021-05-19 NOTE — PROGRESS NOTES
The ABCs of the Annual Wellness Visit  Subsequent Medicare Wellness Visit  You have chosen to receive care through a telehealth visit.  Do you consent to use a video/audio connection for your medical care today? Yes    No chief complaint on file.  The patient presents for a virtual Medicare wellness visit by means of video.  The patient is located at his home and the interview was conducted with him as well as with his spouse.  I am located here at my home office in [facility].    The patient has been doing pretty well.  He is now 76 years of age.  He had a very serious acute illness in July 2020.  He was found to be septic as a result of severe neutropenia.  He was found to have acute leukemia and was transferred to the [facility].  He was treated with daily IV infusions of an arsenic-containing compound.  The patient is now considered to be in complete remission.  He follows on a quarterly basis with Dr. hPan with  oncology.  He also has a history of a peripheral neuropathy and has followed with Dr. Aiken who is in physical medicine and rehabilitation at .  The patient also follows on an annual basis with Dr. Salmeron with urology.    The patient is frustrated because of his inability to drive a vehicle.  This is because of the neuropathy in his feet which keeps him from being able to feel the pedals in the car.  He feels that his neuropathy is gradually worsening over the last few years.  He does have weakness in his lower extremities.  He follows with Dr. Aiken with the department of rehabilitation medicine at .  He does require a walker but requires assistance in getting up out of bed or in getting up out of a chair.  He did fall out of bed on one occasion about a month ago.    Subjective   History of Present Illness:  Rylan Hudson is a 76 y.o. male who presents for a Subsequent Medicare Wellness Visit.    HEALTH RISK ASSESSMENT    Recent Hospitalizations:  Recently treated at the  following:  [facility]    Current Medical Providers:  Patient Care Team:  Blair Fermin MD as PCP - General (Family Medicine)   Primary care-no current provider  Oncology-Dr. macdonald  Rehabilitation medicine-Dr. Aiken  Cardiology-Dr. Marin  Ear nose throat-Dr. Dugan at   Urology-Dr. Salmeron at   Optometry-[facility]  Dentistry-none (edentulous )    Smoking Status:  Quit smoking 10 years ago in 2011  Social History     Tobacco Use   Smoking Status Former Smoker   Smokeless Tobacco Never Used       Alcohol Consumption:  Social History     Substance and Sexual Activity   Alcohol Use No       Depression Screen:   PHQ-2/PHQ-9 Depression Screening 12/26/2019   Little interest or pleasure in doing things 0   Feeling down, depressed, or hopeless 0   Total Score 0   The patient is PHQ 2 evaluation revealed a 0 score for question #1 and a 0 score for question #2.    The patient uses essentially no alcohol.    Fall Risk Screen:  [facility] Fall Risk Assessment has not been completed.   The patient fell out of bed 1 month ago.  He does require the use of a walker for all ambulation.  He does worry about falling.  He has a history of peripheral neuropathy which causes loss of sensation in his lower extremities as well as decreased strength.    Health Habits and Functional and Cognitive Screening:  The patient has no difficulty in preparing food and eating.  He has no difficulty bathing himself or in getting dressed.  He has no difficulty in using the toilet.  He does have some difficulty in moving around from place to place and requires the use of a walker.  He does have difficulty with steps and getting out of a bed or chair.  He has fallen once in the last 6 months.  He eats a well-balanced diet.  He does not obtain regular dental examinations because of his edentulism.  He had a regular eye exam.  This was done last week.  He does not get enough exercise regularly because of his difficulty with strength  and coordination.  He does not need help using a telephone.  He does have decreased hearing bilaterally and requires the use of hearing aids.    No flowsheet data found.      Does the patient have evidence of cognitive impairment? No   A mini cognitive examination was conducted.  The patient was able to remember 3 small words following 5 minutes of conversation.    Asprin use counseling:Contraindicated from taking ASA    Age-appropriate Screening Schedule:  Refer to the list below for future screening recommendations based on patient's age, sex and/or medical conditions. Orders for these recommended tests are listed in the plan section. The patient has been provided with a written plan.    Health Maintenance   Topic Date Due   • TDAP/TD VACCINES (1 - Tdap) Never done   • ZOSTER VACCINE (1 of 2) Never done   • INFLUENZA VACCINE  08/01/2021          The following portions of the patient's history were reviewed and updated as appropriate: past family history and past surgical history.    Outpatient Medications Prior to Visit   Medication Sig Dispense Refill   • Cyanocobalamin (VITAMIN B 12 PO) Take 1,000 mcg by mouth Daily.     • docusate sodium (Colace) 100 MG capsule Take 100 mg by mouth Daily.     • melatonin 5 MG tablet tablet Take 1 tablet by mouth At Night As Needed (insomnia).     • Metoprolol Tartrate 37.5 MG tablet 37.5 mg.     • prochlorperazine (COMPAZINE) 10 MG tablet Take 10 mg by mouth 2 (Two) Times a Day As Needed.       No facility-administered medications prior to visit.       Patient Active Problem List   Diagnosis   • Cholesteatoma   • Renal cell cancer, left (CMS/HCC)   • Sepsis (CMS/HCC)   • Diverticulitis   • Solitary kidney   • Fever   • Pancytopenia (CMS/HCC)   • Anemia   • Thrombocytopenia (CMS/HCC)   • Diarrhea of presumed infectious origin   • At risk for venous thromboembolism (VTE)   • Cannot sleep   • Impaired cognition   • Impaired mobility   • Acute promyelocytic leukemia in remission  (CMS/[facility])       Advanced Care Planning:  ACP discussion was held with the patient during this visit. Patient has an advance directive (not in EMR), copy requested.   The patient does have a living will.  He is spouse keeps 1 at their home.  They are concerned however about sharing this with the care providers for fear that he would be used as a reason to not provide acute resuscitative efforts in the event of a cardiopulmonary arrest.  They do not desire long-term health support such as a ventilator.  I advised him that this would not prevent the use of acute resuscitative care.  They therefore will discuss this with her primary care physician at the next visit and see about bringing in a copy to keep on file at the office.    Review of Systems   No review of systems was conducted because this is a simple wellness visit.    Compared to one year ago, the patient feels his physical health is better.  Compared to one year ago, the patient feels his mental health is better.    Reviewed chart for potential of high risk medication in the elderly: yes  Reviewed chart for potential of harmful drug interactions in the elderly:yes    Objective       There were no vitals filed for this visit.   No vital signs were obtained because this is a virtual visit.    There is no height or weight on file to calculate BMI.   No height and weight were obtained on this occasion because this is a virtual visit.  The patient's previous height and weight indicate an acceptable BMI.  Discussed the patient's BMI with him. The BMI is in the acceptable range.    Physical Exam   No physical examination was conducted because this is a virtual visit.          Assessment/Plan   Medicare Risks and Personalized Health Plan  CMS Preventative Services Quick Reference  Advance Directive Discussion  Colon Cancer Screening  Dementia/Memory   Depression/Dysphoria  Fall Risk  Hearing Problem  Immunizations Discussed/Encouraged (specific immunizations; Td,  Hepatitis A Vaccine/Series, Influenza, Pneumococcal 23, Shingrix and COVID19 )  Inactivity/Sedentary  Prostate Cancer Screening     Please see above regarding the discussion regarding advanced directive.    The patient has never undergone colonoscopy.  He states that it is his strong preference to never undergo 1.  We did also discuss Cologuard testing.  He again is not inclined to obtain this test.  He states that he sees Dr. Salmeron with urology at the [facility] each year.  He undergoes annual reevaluation for any evidence of residual renal cell carcinoma.  He has a CT scan of his abdomen and pelvis.  Dr. Salmeron tells him that on that evaluation there is no evidence of colon cancer.    The patient's memory and dementia has improved greatly since treatment of his acute promyelocytic leukemia.  His mini cognitive examination today was normal.    Because of his peripheral neuropathy the patient does have substantial fall risk.  He is advised to continue use of his walker.  He is advised to continue avoiding driving.  He does get reevaluation from rehabilitation medicine at  and feels that his strength is gradually improving.    The patient does have a history of poor hearing.  He uses hearing aids bilaterally.    The patient had a flu vaccine last autumn.  He is advised to get another one this fall.  He has had previous a pneumococcal 23 vaccine in 2015.  He also believes that he had a Td tetanus immunization during his hospitalization in 2020.  He has not had immunizations for hepatitis A nor for shingles.  He is encouraged to get a Shingrix set of vaccines.  The patient did have Covid 19 vaccine #1 in March 2021 and Covid vaccine #2 in April 2021.    The patient's medications were reviewed.  He is still taking metoprolol 37.5 mg 3 times a day.  Refill was given on this.  He is using this medication because of tachycardia that was associated with his chemotherapy.  He is advised by his oncologist but  that he will eventually be able to come off of that medication.  The long-term effects of arsenic are expected to last for about 2 years.    The patient's level of activity is decreased.  He is encouraged to get what exercise he can on a daily basis.  He currently is using a Cubie device to exercise his lower extremities 15 minutes 3 times a day.    The patient's prostate health is monitored by Dr. Gooden at the [facility] Department of urology.    The above risks/problems have been discussed with the patient.  Pertinent information has been shared with the patient in the After Visit Summary.  Follow up plans and orders are seen below in the Assessment/Plan Section.    Diagnoses and all orders for this visit:    1. Encounter for subsequent annual wellness visit (AWV) in Medicare patient (Primary)    Other orders  -     Metoprolol Tartrate 37.5 MG tablet; Take 37.5 mg by mouth 3 (Three) Times a Day.  Dispense: 90 tablet; Refill: 5      Follow Up:  No follow-ups on file.

## 2023-06-14 ENCOUNTER — DOCUMENTATION (OUTPATIENT)
Dept: FAMILY MEDICINE CLINIC | Facility: CLINIC | Age: 78
End: 2023-06-14
Payer: MEDICARE

## 2023-06-14 PROBLEM — E86.0 DEHYDRATION: Status: ACTIVE | Noted: 2023-06-14

## 2023-06-14 PROBLEM — S72.022A: Status: ACTIVE | Noted: 2023-06-14

## 2023-06-14 PROBLEM — E66.811 OBESITY (BMI 30.0-34.9): Chronic | Status: ACTIVE | Noted: 2023-06-03

## 2023-06-14 PROBLEM — T45.1X5A CHEMOTHERAPY-INDUCED PERIPHERAL NEUROPATHY: Status: ACTIVE | Noted: 2021-10-18

## 2023-06-14 PROBLEM — Z86.79 HISTORY OF PAROXYSMAL SUPRAVENTRICULAR TACHYCARDIA: Status: ACTIVE | Noted: 2022-02-16

## 2023-06-14 PROBLEM — S72.002A CLOSED FRACTURE OF NECK OF LEFT FEMUR: Status: ACTIVE | Noted: 2023-06-03

## 2023-06-14 PROBLEM — G62.0 CHEMOTHERAPY-INDUCED PERIPHERAL NEUROPATHY: Status: ACTIVE | Noted: 2021-10-18

## 2023-06-14 PROBLEM — R10.9 ABDOMINAL PAIN: Status: ACTIVE | Noted: 2023-06-14

## 2023-06-14 PROBLEM — N18.30 CKD (CHRONIC KIDNEY DISEASE) STAGE 3, GFR 30-59 ML/MIN: Status: ACTIVE | Noted: 2023-06-04

## 2023-06-14 PROBLEM — K59.00 CONSTIPATION: Status: ACTIVE | Noted: 2023-06-14

## 2023-06-14 PROBLEM — E66.9 OBESITY (BMI 30.0-34.9): Chronic | Status: ACTIVE | Noted: 2023-06-03

## 2023-06-14 PROBLEM — J30.2 OTHER SEASONAL ALLERGIC RHINITIS: Status: ACTIVE | Noted: 2023-06-14

## 2023-06-14 RX ORDER — GABAPENTIN 100 MG/1
200 CAPSULE ORAL 2 TIMES DAILY
COMMUNITY
End: 2023-06-15 | Stop reason: SDUPTHER

## 2023-06-14 RX ORDER — OXYCODONE HYDROCHLORIDE 5 MG/1
5 TABLET ORAL 2 TIMES DAILY
COMMUNITY
End: 2023-06-15 | Stop reason: SDUPTHER

## 2023-06-15 ENCOUNTER — NURSING HOME (OUTPATIENT)
Dept: INTERNAL MEDICINE | Facility: CLINIC | Age: 78
End: 2023-06-15
Payer: MEDICARE

## 2023-06-15 VITALS
TEMPERATURE: 97.5 F | OXYGEN SATURATION: 95 % | SYSTOLIC BLOOD PRESSURE: 118 MMHG | RESPIRATION RATE: 16 BRPM | WEIGHT: 262 LBS | HEART RATE: 72 BPM | DIASTOLIC BLOOD PRESSURE: 59 MMHG | BODY MASS INDEX: 35.53 KG/M2

## 2023-06-15 DIAGNOSIS — S72.022A: Primary | ICD-10-CM

## 2023-06-15 DIAGNOSIS — C64.2 RENAL CELL CANCER, LEFT: ICD-10-CM

## 2023-06-15 DIAGNOSIS — E66.9 OBESITY (BMI 30.0-34.9): Chronic | ICD-10-CM

## 2023-06-15 DIAGNOSIS — C92.40 ACUTE PROMYELOCYTIC LEUKEMIA NOT HAVING ACHIEVED REMISSION: ICD-10-CM

## 2023-06-15 DIAGNOSIS — J30.2 SEASONAL ALLERGIES: ICD-10-CM

## 2023-06-15 DIAGNOSIS — G62.0 CHEMOTHERAPY-INDUCED PERIPHERAL NEUROPATHY: ICD-10-CM

## 2023-06-15 DIAGNOSIS — D69.6 THROMBOCYTOPENIA: ICD-10-CM

## 2023-06-15 DIAGNOSIS — G47.00 INSOMNIA, UNSPECIFIED TYPE: ICD-10-CM

## 2023-06-15 DIAGNOSIS — Z86.79 HISTORY OF PAROXYSMAL SUPRAVENTRICULAR TACHYCARDIA: ICD-10-CM

## 2023-06-15 DIAGNOSIS — T45.1X5A CHEMOTHERAPY-INDUCED PERIPHERAL NEUROPATHY: ICD-10-CM

## 2023-06-15 DIAGNOSIS — Z74.09 IMPAIRED MOBILITY: ICD-10-CM

## 2023-06-15 DIAGNOSIS — D61.818 PANCYTOPENIA: ICD-10-CM

## 2023-06-15 DIAGNOSIS — N18.31 STAGE 3A CHRONIC KIDNEY DISEASE: ICD-10-CM

## 2023-06-15 RX ORDER — GABAPENTIN 100 MG/1
200 CAPSULE ORAL 2 TIMES DAILY
Qty: 120 CAPSULE | Refills: 0 | Status: SHIPPED | OUTPATIENT
Start: 2023-06-15 | End: 2023-06-15 | Stop reason: SDUPTHER

## 2023-06-15 RX ORDER — OXYCODONE HYDROCHLORIDE 5 MG/1
5 TABLET ORAL 2 TIMES DAILY
Qty: 60 TABLET | Refills: 0 | Status: SHIPPED | OUTPATIENT
Start: 2023-06-15

## 2023-06-15 RX ORDER — OXYCODONE HYDROCHLORIDE 5 MG/1
5 TABLET ORAL 2 TIMES DAILY
Qty: 60 TABLET | Refills: 0 | Status: SHIPPED | OUTPATIENT
Start: 2023-06-15 | End: 2023-06-15 | Stop reason: SDUPTHER

## 2023-06-15 RX ORDER — GABAPENTIN 100 MG/1
200 CAPSULE ORAL 2 TIMES DAILY
Qty: 120 CAPSULE | Refills: 0 | Status: SHIPPED | OUTPATIENT
Start: 2023-06-15

## 2023-06-15 NOTE — LETTER
Nursing Home History and Physical       Osbaldo Mckeon DO  [address] [place] 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 difficile, and PJP pneumonia), and CKD who was recently hospitalized at  for follow-up with resultant left femoral neck fracture.  Patient underwent left hemiarthroplasty on 6/4/2023.  Hospitalization was relatively uncomplicated.  Patient did have some mild postoperative anemia as well as mild DEMOND which stabilized by the time of discharge.    On exam today, patient shared concerns about neuropathic pains and the lack of obtaining gabapentin since admission.  Pharmacy has received his scripts and was delivering medications for later this afternoon.  He was with his son was at bedside and was supportive.  They were appreciative of their care.  Patient was motivated to work with PT for strengthening and improving mobility.      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     Atrial tachycardia     Benign tumor of ear and external auditory canal 2015    Cancer     Cancer of kidney     LEFT    Chemotherapy-induced neuropathy     Chronic kidney disease, stage 3     Difficulty walking     Diverticulitis of intestine     Diverticulosis     Glomus tumor     Hearing disorder     HL (hearing loss)     Hypertension     Infection in bloodstream     Insomnia     Kidney lump     Obesity     Renal insufficiency     Solitary kidney,  acquired     Ventral incisional hernia       Past Surgical History:   Procedure Laterality Date    HERNIA REPAIR      HIP FRACTURE SURGERY Left     NEPHRECTOMY RADICAL Left 2016    TONSILLECTOMY           MEDICATIONS:  I have reviewed and reconciled the patients medication list in the patients chart at the [facility] on 06/15/2023.      ALLERGIES:  Allergies   Allergen Reactions    Penicillin G Other (See Comments)         SOCIAL HISTORY:  Social History     Socioeconomic History    Marital status:    Tobacco Use    Smoking status: Former    Smokeless tobacco: Never   Substance and Sexual Activity    Alcohol use: No    Drug use: No       FAMILY HISTORY:  Family History   Problem Relation Age of Onset    Stroke Mother     Stroke Father     Cancer Father     Prostate cancer Father         REVIEW OF SYSTEMS:  Review of Systems   Constitutional:  Negative for chills, fatigue and fever.   HENT:  Negative for congestion, ear pain, rhinorrhea, sinus pressure and sore throat.    Eyes:  Negative for visual disturbance.   Respiratory:  Negative for cough, chest tightness, shortness of breath and wheezing.    Cardiovascular:  Negative for chest pain, palpitations and leg swelling.   Gastrointestinal:  Negative for abdominal pain, blood in stool, constipation, diarrhea, nausea and vomiting.   Endocrine: Negative for polydipsia and polyuria.   Genitourinary:  Negative for dysuria and hematuria.   Musculoskeletal:  Positive for arthralgias. Negative for back pain.   Skin:  Negative for rash.   Neurological:  Negative for dizziness, light-headedness, numbness and headaches.   Psychiatric/Behavioral:  Negative for dysphoric mood and sleep disturbance. The patient is not nervous/anxious.        PHYSICAL EXAMINATION:   VITAL SIGNS: /59   Pulse 72   Temp 97.5 °F (36.4 °C)   Resp 16   Wt 119 kg (262 lb)   SpO2 95%   BMI 35.53 kg/m²     Physical Exam  Vitals and nursing note reviewed.   Constitutional:        Appearance: Normal appearance. He is well-developed.   HENT:      Head: Normocephalic and atraumatic.      Nose: Nose normal.      Mouth/Throat:      Mouth: Mucous membranes are moist.      Pharynx: No oropharyngeal exudate.   Eyes:      General: No scleral icterus.     Conjunctiva/sclera: Conjunctivae normal.      Pupils: Pupils are equal, round, and reactive to light.   Neck:      Thyroid: No thyromegaly.   Cardiovascular:      Rate and Rhythm: Normal rate and regular rhythm.      Heart sounds: Normal heart sounds. No murmur heard.    No friction rub. No gallop.   Pulmonary:      Effort: Pulmonary effort is normal. No respiratory distress.      Breath sounds: Normal breath sounds. No wheezing.   Abdominal:      General: Bowel sounds are normal. There is no distension.      Palpations: Abdomen is soft.      Tenderness: There is no abdominal tenderness.   Musculoskeletal:         General: No deformity or signs of injury.      Cervical back: Normal range of motion and neck supple.   Lymphadenopathy:      Cervical: No cervical adenopathy.   Skin:     General: Skin is warm and dry.      Findings: No rash.   Neurological:      Mental Status: He is alert and oriented to person, place, and time.   Psychiatric:         Mood and Affect: Mood normal.         Behavior: Behavior normal.       RECORDS REVIEW:   Discharge Summary from [facility] 6/13/2023    ASSESSMENT   Diagnoses and all orders for this visit:    1. Closed fracture of epiphysis of neck of femur, left, initial encounter (Primary)    2. Pancytopenia    3. Stage 3a chronic kidney disease    4. Renal cell cancer, left    5. Chemotherapy-induced peripheral neuropathy    6. Thrombocytopenia    7. History of paroxysmal supraventricular tachycardia    8. Impaired mobility    9. 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 Insomnia, unspecified type    11. Seasonal allergies    12. Obesity (BMI 30.0-34.9)    Other orders  -     oxyCODONE  (ROXICODONE) 5 MG immediate release tablet; Take 1 tablet by mouth 2 (Two) Times a Day.  Dispense: 60 tablet; Refill: 0  -     gabapentin (NEURONTIN) 100 MG capsule; Take 2 capsules by mouth 2 (Two) Times a Day. TAKE 2 CAPS =  MG PO BID  Dispense: 120 capsule; Refill: 0        PLAN  Left femoral neck fracture s/p left hemiarthroplasty  - Treated on 6/4/2023 at .  Follow-up with orthopedics as scheduled.  - Patient is currently weightbearing to the lower extremity.  Continue PT/OT and nursing facility to improve mobility with goals to return home.  - Lovenox until 7/1/2023 for DVT prophylaxis.    Acute anemia  - Due to postoperative blood loss.  Hemoglobin has been stable around 10.5 at the time of discharge from hospitalization.    CKD stage III with history of RCC status post nephrectomy  - Renal function stable with creatinine between 1.3-1.4.  - Follow-up with nephrology as scheduled.    Chemo-induced peripheral neuropathy  - Patient does have persistent pains.  He was reassured that his gabapentin was on the way.  He seemed content with this explanation and was not as agitated regarding the situation.    Leukemia (APL)  - Follow-up with hematology as scheduled at .    History of SVT  - Stable on metoprolol    Obesity  - Complicates aspects of care.    Insomnia  - Stable on mirtazapine    Seasonal allergies  - Continue cetirizine    Glomus tumor with left-sided hearing loss  - Follow-up with  ENT          [x]  Discussed Patient in detail with nursing/staff, addressed all needs today.     [x]  Plan of Care Reviewed   [x]  PT/OT Reviewed   []  Order Changes  []  Discharge Plans Reviewed  [x]  Advance Directive on file with Nursing Home.   [x]  POA on file with Nursing Home.    [x]  Code Status listed and reviewed.       Osbaldo Mckeon DO.  6/19/2023      **Part of this note may be an electronic transcription/translation of spoken language to printed text using the Dragon Dictation System.** t

## 2023-06-15 NOTE — TELEPHONE ENCOUNTER
[facility] NEW MEDICATION REQUEST FOR GABAPENTIN 100 MG AND OXYCODONE 5 MG    GABAPENTIN DIRECTIONS: TAKE 2 CAPSULE BY MOUTH TWICE DAILY.    OXYCODONE DIRECTIONS: TAKE 1 TABLET BY MOUTH EVERY 12 HRS SCHEDULED.

## 2023-06-19 NOTE — PROGRESS NOTES
Nursing Home History and Physical       Osbaldo Mckeon DO  [address] [place] 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 difficile, and PJP pneumonia), and CKD who was recently hospitalized at  for follow-up with resultant left femoral neck fracture.  Patient underwent left hemiarthroplasty on 6/4/2023.  Hospitalization was relatively uncomplicated.  Patient did have some mild postoperative anemia as well as mild DEMOND which stabilized by the time of discharge.    On exam today, patient shared concerns about neuropathic pains and the lack of obtaining gabapentin since admission.  Pharmacy has received his scripts and was delivering medications for later this afternoon.  He was with his son was at bedside and was supportive.  They were appreciative of their care.  Patient was motivated to work with PT for strengthening and improving mobility.      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     Atrial tachycardia     Benign tumor of ear and external auditory canal 2015    Cancer     Cancer of kidney     LEFT    Chemotherapy-induced neuropathy     Chronic kidney disease, stage 3     Difficulty walking     Diverticulitis of intestine     Diverticulosis     Glomus tumor     Hearing disorder     HL (hearing loss)     Hypertension     Infection in bloodstream     Insomnia     Kidney lump     Obesity     Renal insufficiency     Solitary kidney,  acquired     Ventral incisional hernia       Past Surgical History:   Procedure Laterality Date    HERNIA REPAIR      HIP FRACTURE SURGERY Left     NEPHRECTOMY RADICAL Left 2016    TONSILLECTOMY           MEDICATIONS:  I have reviewed and reconciled the patients medication list in the patients chart at the [facility] nursing [facility] on 06/15/2023.      ALLERGIES:  Allergies   Allergen Reactions    Penicillin G Other (See Comments)         SOCIAL HISTORY:  Social History     Socioeconomic History    Marital status:    Tobacco Use    Smoking status: Former    Smokeless tobacco: Never   Substance and Sexual Activity    Alcohol use: No    Drug use: No       FAMILY HISTORY:  Family History   Problem Relation Age of Onset    Stroke Mother     Stroke Father     Cancer Father     Prostate cancer Father         REVIEW OF SYSTEMS:  Review of Systems   Constitutional:  Negative for chills, fatigue and fever.   HENT:  Negative for congestion, ear pain, rhinorrhea, sinus pressure and sore throat.    Eyes:  Negative for visual disturbance.   Respiratory:  Negative for cough, chest tightness, shortness of breath and wheezing.    Cardiovascular:  Negative for chest pain, palpitations and leg swelling.   Gastrointestinal:  Negative for abdominal pain, blood in stool, constipation, diarrhea, nausea and vomiting.   Endocrine: Negative for polydipsia and polyuria.   Genitourinary:  Negative for dysuria and hematuria.   Musculoskeletal:  Positive for arthralgias. Negative for back pain.   Skin:  Negative for rash.   Neurological:  Negative for dizziness, light-headedness, numbness and headaches.   Psychiatric/Behavioral:  Negative for dysphoric mood and sleep disturbance. The patient is not nervous/anxious.        PHYSICAL EXAMINATION:   VITAL SIGNS: /59   Pulse 72   Temp 97.5 °F (36.4 °C)   Resp 16   Wt 119 kg (262 lb)   SpO2 95%   BMI 35.53 kg/m²     Physical Exam  Vitals and nursing note reviewed.   Constitutional:        Appearance: Normal appearance. He is well-developed.   HENT:      Head: Normocephalic and atraumatic.      Nose: Nose normal.      Mouth/Throat:      Mouth: Mucous membranes are moist.      Pharynx: No oropharyngeal exudate.   Eyes:      General: No scleral icterus.     Conjunctiva/sclera: Conjunctivae normal.      Pupils: Pupils are equal, round, and reactive to light.   Neck:      Thyroid: No thyromegaly.   Cardiovascular:      Rate and Rhythm: Normal rate and regular rhythm.      Heart sounds: Normal heart sounds. No murmur heard.    No friction rub. No gallop.   Pulmonary:      Effort: Pulmonary effort is normal. No respiratory distress.      Breath sounds: Normal breath sounds. No wheezing.   Abdominal:      General: Bowel sounds are normal. There is no distension.      Palpations: Abdomen is soft.      Tenderness: There is no abdominal tenderness.   Musculoskeletal:         General: No deformity or signs of injury.      Cervical back: Normal range of motion and neck supple.   Lymphadenopathy:      Cervical: No cervical adenopathy.   Skin:     General: Skin is warm and dry.      Findings: No rash.   Neurological:      Mental Status: He is alert and oriented to person, place, and time.   Psychiatric:         Mood and Affect: Mood normal.         Behavior: Behavior normal.       RECORDS REVIEW:   Discharge Summary from [facility] 6/13/2023    ASSESSMENT   Diagnoses and all orders for this visit:    1. Closed fracture of epiphysis of neck of femur, left, initial encounter (Primary)    2. Pancytopenia    3. Stage 3a chronic kidney disease    4. Renal cell cancer, left    5. Chemotherapy-induced peripheral neuropathy    6. Thrombocytopenia    7. History of paroxysmal supraventricular tachycardia    8. Impaired mobility    9. 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 Insomnia, unspecified type    11. Seasonal allergies    12. Obesity (BMI 30.0-34.9)    Other orders  -     oxyCODONE  (ROXICODONE) 5 MG immediate release tablet; Take 1 tablet by mouth 2 (Two) Times a Day.  Dispense: 60 tablet; Refill: 0  -     gabapentin (NEURONTIN) 100 MG capsule; Take 2 capsules by mouth 2 (Two) Times a Day. TAKE 2 CAPS =  MG PO BID  Dispense: 120 capsule; Refill: 0        PLAN  Left femoral neck fracture s/p left hemiarthroplasty  - Treated on 6/4/2023 at .  Follow-up with orthopedics as scheduled.  - Patient is currently weightbearing to the lower extremity.  Continue PT/OT and nursing facility to improve mobility with goals to return home.  - Lovenox until 7/1/2023 for DVT prophylaxis.    Acute anemia  - Due to postoperative blood loss.  Hemoglobin has been stable around 10.5 at the time of discharge from hospitalization.    CKD stage III with history of RCC status post nephrectomy  - Renal function stable with creatinine between 1.3-1.4.  - Follow-up with nephrology as scheduled.    Chemo-induced peripheral neuropathy  - Patient does have persistent pains.  He was reassured that his gabapentin was on the way.  He seemed content with this explanation and was not as agitated regarding the situation.    Leukemia (APL)  - Follow-up with hematology as scheduled at .    History of SVT  - Stable on metoprolol    Obesity  - Complicates aspects of care.    Insomnia  - Stable on mirtazapine    Seasonal allergies  - Continue cetirizine    Glomus tumor with left-sided hearing loss  - Follow-up with  ENT          [x]  Discussed Patient in detail with nursing/staff, addressed all needs today.     [x]  Plan of Care Reviewed   [x]  PT/OT Reviewed   []  Order Changes  []  Discharge Plans Reviewed  [x]  Advance Directive on file with Nursing Home.   [x]  POA on file with Nursing Home.    [x]  Code Status listed and reviewed.       Osbaldo Mckeon DO.  6/19/2023      **Part of this note may be an electronic transcription/translation of spoken language to printed text using the Dragon Dictation System.** t

## 2023-08-16 RX ORDER — MIRTAZAPINE 15 MG/1
TABLET, FILM COATED ORAL
Qty: 30 TABLET | OUTPATIENT
Start: 2023-08-16

## 2025-04-10 ENCOUNTER — DOCUMENTATION (OUTPATIENT)
Dept: FAMILY MEDICINE CLINIC | Facility: CLINIC | Age: 80
End: 2025-04-10
Payer: MEDICARE

## 2025-04-10 RX ORDER — AZELASTINE HYDROCHLORIDE, FLUTICASONE PROPIONATE 137; 50 UG/1; UG/1
1 SPRAY, METERED NASAL DAILY
COMMUNITY

## 2025-04-10 RX ORDER — ECHINACEA PURPUREA EXTRACT 125 MG
2 TABLET ORAL 4 TIMES DAILY
COMMUNITY

## 2025-04-10 RX ORDER — POLYETHYLENE GLYCOL 3350 17 G/17G
17 POWDER, FOR SOLUTION ORAL DAILY
COMMUNITY

## 2025-04-10 RX ORDER — ONDANSETRON 4 MG/1
4 TABLET, ORALLY DISINTEGRATING ORAL EVERY 6 HOURS PRN
COMMUNITY

## 2025-04-10 RX ORDER — ATORVASTATIN CALCIUM 20 MG/1
20 TABLET, FILM COATED ORAL DAILY
COMMUNITY

## 2025-04-10 RX ORDER — LORATADINE 10 MG/1
10 TABLET ORAL DAILY
COMMUNITY

## 2025-04-10 RX ORDER — FLUTICASONE PROPIONATE 50 MCG
2 SPRAY, SUSPENSION (ML) NASAL DAILY
COMMUNITY

## 2025-04-10 RX ORDER — ESCITALOPRAM OXALATE 5 MG/1
5 TABLET ORAL DAILY
COMMUNITY

## 2025-04-10 RX ORDER — ENOXAPARIN SODIUM 100 MG/ML
40 INJECTION SUBCUTANEOUS
COMMUNITY

## 2025-04-10 RX ORDER — MIRTAZAPINE 15 MG/1
15 TABLET, ORALLY DISINTEGRATING ORAL NIGHTLY
COMMUNITY

## 2025-04-10 RX ORDER — SENNOSIDES 8.6 MG
650 CAPSULE ORAL EVERY 6 HOURS PRN
COMMUNITY

## 2025-04-10 RX ORDER — METOPROLOL TARTRATE 25 MG/1
25 TABLET, FILM COATED ORAL 2 TIMES DAILY
COMMUNITY

## 2025-04-16 NOTE — PROGRESS NOTES
Nursing Home History and Physical       Osbaldo Mckeon DO  [address] [place] [address] Phone: [phone]  Fax: [phone]     PATIENT NAME: Rylan Hudson                                                                          YOB: 1945           DATE OF SERVICE: 04/17/2025  FACILITY:  [facility]    CHIEF COMPLAINT:  Nursing facility admission    History of Present Illness  The patient is an 80-year-old male with a history of hypertension, insomnia, and depression, [facility] resulting in a right femur neck fracture after a ground-level fall. Surgical fixation was performed on 04/02/2025. Hospitalization was complicated by mild acute kidney injury (DEMOND) with a creatinine level up to 1.5, which improved with IV fluids. Due to debility and weakness, transfer to this facility was necessary for further strengthening and rehabilitation.    Significant pain in the right hip is reported, managed with ample pain medication. Active participation in physical therapy is noted, although it is challenging. Swelling in the left hip, fractured 2 years prior, is also mentioned. Uncertainty exists regarding the removal of the dressing from the current surgical site. [facility] was canceled due to unforeseen circumstances, and rescheduling has not yet occurred per patient.     PAST SURGICAL HISTORY:  Surgical fixation of right femur neck fracture on 04/02/2025.  Surgical fixation of left femur neck fracture 2 years ago.       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     Atrial tachycardia     Benign tumor of  ear and external auditory canal 2015    Bilateral lower extremity edema     Cancer     Cancer of kidney     LEFT    Chemotherapy-induced neuropathy     Chronic kidney disease, stage 3     Chronic pain syndrome     Closed displaced fracture of right femoral neck, history of     Constipation     Depression     Difficulty walking     Diverticulitis of intestine     Diverticulosis     Exposure to potentially hazardous substance     Fall at home     Fracture of unspecified part of neck of left femur, subsequent encounter for closed fracture with routine healing     Fracture of unspecified part of neck of right femur, subsequent encounter for closed fracture with routine healing     Glomus tumor     Hearing disorder     HL (hearing loss)     Hx of degenerative disc disease     Hyperlipidemia     Hypertension     Infection in bloodstream     Insomnia     Insomnia, unspecified     Kidney lump     Malignant neoplasm of left kidney, except renal pelvis     Mood disorder     Muscle weakness (generalized)     Neuropathy     Obesity     Presence of right artificial hip joint     Recurrent falls     Renal insufficiency     Repeated falls     Solitary kidney, acquired     Urinary tract infection     Ventral incisional hernia       Past Surgical History:   Procedure Laterality Date    HERNIA REPAIR      HIP FRACTURE SURGERY Left     HIP HEMIARTHROPLASTY Right 04/2025    NEPHRECTOMY RADICAL Left 2016    TONSILLECTOMY           MEDICATIONS:  I have reviewed and reconciled the patients medication list in the patients chart at the skilled nursing facility on 04/17/2025.      ALLERGIES:  Allergies   Allergen Reactions    Penicillin G Other (See Comments)         SOCIAL HISTORY:  Social History     Socioeconomic History    Marital status:    Tobacco Use    Smoking status: Former    Smokeless tobacco: Never   Substance and Sexual Activity    Alcohol use: No    Drug use: No       FAMILY HISTORY:  Family History   Problem Relation  Age of Onset    Stroke Mother     Stroke Father     Cancer Father     Prostate cancer Father         REVIEW OF SYSTEMS:  Review of Systems   Constitutional:  Negative for chills, fatigue and fever.   HENT:  Negative for congestion, ear pain, rhinorrhea, sinus pressure and sore throat.    Eyes:  Negative for visual disturbance.   Respiratory:  Negative for cough, chest tightness, shortness of breath and wheezing.    Cardiovascular:  Negative for chest pain, palpitations and leg swelling.   Gastrointestinal:  Negative for abdominal pain, blood in stool, constipation, diarrhea, nausea and vomiting.   Endocrine: Negative for polydipsia and polyuria.   Genitourinary:  Negative for dysuria and hematuria.   Musculoskeletal:  Negative for arthralgias and back pain.   Skin:  Negative for rash.   Neurological:  Negative for dizziness, light-headedness, numbness and headaches.   Psychiatric/Behavioral:  Negative for dysphoric mood and sleep disturbance. The patient is not nervous/anxious.        PHYSICAL EXAMINATION:   VITAL SIGNS: /66   Pulse 75   Temp 98.7 °F (37.1 °C)   Resp 16   SpO2 98%     Physical Exam  Vitals and nursing note reviewed.   Constitutional:       Appearance: Normal appearance. He is well-developed.   HENT:      Head: Normocephalic and atraumatic.      Nose: Nose normal.      Mouth/Throat:      Mouth: Mucous membranes are moist.      Pharynx: No oropharyngeal exudate.   Eyes:      General: No scleral icterus.     Conjunctiva/sclera: Conjunctivae normal.      Pupils: Pupils are equal, round, and reactive to light.   Neck:      Thyroid: No thyromegaly.   Cardiovascular:      Rate and Rhythm: Normal rate and regular rhythm.      Heart sounds: Normal heart sounds. No murmur heard.     No friction rub. No gallop.   Pulmonary:      Effort: Pulmonary effort is normal. No respiratory distress.      Breath sounds: Normal breath sounds. No wheezing.   Abdominal:      General: Bowel sounds are normal. There  is no distension.      Palpations: Abdomen is soft.      Tenderness: There is no abdominal tenderness.   Musculoskeletal:         General: No deformity or signs of injury.      Cervical back: Normal range of motion and neck supple.      Comments: Right hip with bandages in place, minimal RLE edema   Lymphadenopathy:      Cervical: No cervical adenopathy.   Skin:     General: Skin is warm and dry.      Findings: No rash.   Neurological:      Mental Status: He is alert and oriented to person, place, and time.   Psychiatric:         Mood and Affect: Mood normal.         Behavior: Behavior normal.         RECORDS REVIEW:   Discharge Summary [facility] 4/11/2025  Results  Labs   - Hemoglobin: 04/11/2025, 11.1   - MCV: 04/11/2025, 100.9   - WBC: 04/11/2025, 9.35   - Creatinine: 04/11/2025, 1.24   - Potassium: 04/11/2025, 4.4   - Sodium: 04/11/2025, 137   - A1c: 04/11/2025, 5.4   - Lipids: 04/11/2025, Normal   - TSH: 04/11/2025, 1.32    ASSESSMENT   Diagnoses and all orders for this visit:    1. Closed displaced fracture of right femoral neck (Primary)    2. Stage 3a chronic kidney disease    3. Seasonal allergic rhinitis due to other allergic trigger    4. Type 2 diabetes mellitus with diabetic autonomic neuropathy, without long-term current use of insulin    5. Anxiety and depression        Assessment & Plan  1. Right femoral neck fracture status post surgical fixation.  - Pain control is managed with acetaminophen and oxycodone as needed.  - Physical examination reveals balanced swelling in both legs.  - Follow-up with orthopedics is scheduled for 04/17/2025; patient advised to use compression and keep legs elevated.  - Continue physical therapy (PT), occupational therapy (OT), and rehabilitation at the facility. Lovenox 40 mg subcutaneous for DVT prophylaxis is to be continued per orthopedic recommendations.    2. Chronic Kidney Disease (CKD) stage IIIa.  - Renal function is stable at this time.  - Creatinine level  improved to 1.24 with intravenous fluids.  - Patient encouraged to stay hydrated.  - No additional treatment changes needed at this time.    3. Allergic rhinitis.  - Symptoms noted during hospitalization.  - Continue nasal saline and azelastine nasal sprays as needed.  - Continue loratadine for symptom management.  - No new symptoms reported.    4. Neuropathy.  - Continue gabapentin for symptom management.  - No new symptoms or changes in condition reported.  - Patient reports stable condition with current medication.  - No additional treatment changes needed at this time.    5. Anxiety / Depression   - cont Lexapro and Remeron nightly. (Home regimen)         [x]  Discussed Patient in detail with nursing/staff, addressed all needs today.     [x]  Plan of Care Reviewed   [x]  PT/OT Reviewed   []  Order Changes  []  Discharge Plans Reviewed  [x]  Advance Directive on file with Nursing Home.   [x]  POA on file with Nursing Home.    [x]  Code Status listed and reviewed.     Osblado Mckeon DO.  4/20/2025      **Part of this note may be an electronic transcription/translation of spoken language to printed text using the Dragon Dictation System.**    Patient or patient representative verbalized consent for the use of Ambient Listening during the visit with  Osbaldo Mckeon DO for chart documentation. 4/20/2025  12:49 EDT

## 2025-04-17 ENCOUNTER — NURSING HOME (OUTPATIENT)
Dept: INTERNAL MEDICINE | Facility: CLINIC | Age: 80
End: 2025-04-17
Payer: MEDICARE

## 2025-04-17 VITALS
OXYGEN SATURATION: 98 % | RESPIRATION RATE: 16 BRPM | HEART RATE: 75 BPM | TEMPERATURE: 98.7 F | DIASTOLIC BLOOD PRESSURE: 66 MMHG | SYSTOLIC BLOOD PRESSURE: 120 MMHG

## 2025-04-17 DIAGNOSIS — S72.001A CLOSED DISPLACED FRACTURE OF RIGHT FEMORAL NECK: Primary | ICD-10-CM

## 2025-04-17 DIAGNOSIS — N18.31 STAGE 3A CHRONIC KIDNEY DISEASE: ICD-10-CM

## 2025-04-17 DIAGNOSIS — E11.43 TYPE 2 DIABETES MELLITUS WITH DIABETIC AUTONOMIC NEUROPATHY, WITHOUT LONG-TERM CURRENT USE OF INSULIN: ICD-10-CM

## 2025-04-17 DIAGNOSIS — F32.A ANXIETY AND DEPRESSION: ICD-10-CM

## 2025-04-17 DIAGNOSIS — J30.89 SEASONAL ALLERGIC RHINITIS DUE TO OTHER ALLERGIC TRIGGER: ICD-10-CM

## 2025-04-17 DIAGNOSIS — F41.9 ANXIETY AND DEPRESSION: ICD-10-CM

## 2025-04-17 NOTE — LETTER
Nursing Home History and Physical       Osbaldo Mckeon DO  [address] [place] [address] Phone: [phone]  Fax: [phone]     PATIENT NAME: Rylan Hudson                                                                          YOB: 1945           DATE OF SERVICE: 04/17/2025  FACILITY:  [facility]    CHIEF COMPLAINT:  Nursing facility admission    History of Present Illness  The patient is an 80-year-old male with a history of hypertension, insomnia, and depression, [facility] resulting in a right femur neck fracture after a ground-level fall. Surgical fixation was performed on 04/02/2025. Hospitalization was complicated by mild acute kidney injury (DEMOND) with a creatinine level up to 1.5, which improved with IV fluids. Due to debility and weakness, transfer to this facility was necessary for further strengthening and rehabilitation.    Significant pain in the right hip is reported, managed with ample pain medication. Active participation in physical therapy is noted, although it is challenging. Swelling in the left hip, fractured 2 years prior, is also mentioned. Uncertainty exists regarding the removal of the dressing from the current surgical site. [facility] was canceled due to unforeseen circumstances, and rescheduling has not yet occurred per patient.     PAST SURGICAL HISTORY:  Surgical fixation of right femur neck fracture on 04/02/2025.  Surgical fixation of left femur neck fracture 2 years ago.       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   • Bilateral lower extremity edema    • Cancer    • Cancer of kidney     LEFT   • Chemotherapy-induced neuropathy    • Chronic kidney disease, stage 3    • Chronic pain syndrome    • Closed displaced fracture of right femoral neck, history of    • Constipation    • Depression    • Difficulty walking    • Diverticulitis of intestine    • Diverticulosis    • Exposure to potentially hazardous substance    • Fall at home    • Fracture of unspecified part of neck of left femur, subsequent encounter for closed fracture with routine healing    • Fracture of unspecified part of neck of right femur, subsequent encounter for closed fracture with routine healing    • Glomus tumor    • Hearing disorder    • HL (hearing loss)    • Hx of degenerative disc disease    • Hyperlipidemia    • Hypertension    • Infection in bloodstream    • Insomnia    • Insomnia, unspecified    • Kidney lump    • Malignant neoplasm of left kidney, except renal pelvis    • Mood disorder    • Muscle weakness (generalized)    • Neuropathy    • Obesity    • Presence of right artificial hip joint    • Recurrent falls    • Renal insufficiency    • Repeated falls    • Solitary kidney, acquired    • Urinary tract infection    • Ventral incisional hernia       Past Surgical History:   Procedure Laterality Date   • HERNIA REPAIR     • HIP FRACTURE SURGERY Left    • HIP HEMIARTHROPLASTY Right 04/2025   • NEPHRECTOMY RADICAL Left 2016   • TONSILLECTOMY           MEDICATIONS:  I have reviewed and reconciled the patients medication list in the patients chart at the [facility] on 04/17/2025.      ALLERGIES:  Allergies   Allergen Reactions   • Penicillin G Other (See Comments)         SOCIAL HISTORY:  Social History     Socioeconomic History   • Marital status:    Tobacco Use   • Smoking status: Former   • Smokeless tobacco: Never   Substance and Sexual Activity   • Alcohol use: No   • Drug use: No        FAMILY HISTORY:  Family History   Problem Relation Age of Onset   • Stroke Mother    • Stroke Father    • Cancer Father    • Prostate cancer Father         REVIEW OF SYSTEMS:  Review of Systems   Constitutional:  Negative for chills, fatigue and fever.   HENT:  Negative for congestion, ear pain, rhinorrhea, sinus pressure and sore throat.    Eyes:  Negative for visual disturbance.   Respiratory:  Negative for cough, chest tightness, shortness of breath and wheezing.    Cardiovascular:  Negative for chest pain, palpitations and leg swelling.   Gastrointestinal:  Negative for abdominal pain, blood in stool, constipation, diarrhea, nausea and vomiting.   Endocrine: Negative for polydipsia and polyuria.   Genitourinary:  Negative for dysuria and hematuria.   Musculoskeletal:  Negative for arthralgias and back pain.   Skin:  Negative for rash.   Neurological:  Negative for dizziness, light-headedness, numbness and headaches.   Psychiatric/Behavioral:  Negative for dysphoric mood and sleep disturbance. The patient is not nervous/anxious.        PHYSICAL EXAMINATION:   VITAL SIGNS: /66   Pulse 75   Temp 98.7 °F (37.1 °C)   Resp 16   SpO2 98%     Physical Exam  Vitals and nursing note reviewed.   Constitutional:       Appearance: Normal appearance. He is well-developed.   HENT:      Head: Normocephalic and atraumatic.      Nose: Nose normal.      Mouth/Throat:      Mouth: Mucous membranes are moist.      Pharynx: No oropharyngeal exudate.   Eyes:      General: No scleral icterus.     Conjunctiva/sclera: Conjunctivae normal.      Pupils: Pupils are equal, round, and reactive to light.   Neck:      Thyroid: No thyromegaly.   Cardiovascular:      Rate and Rhythm: Normal rate and regular rhythm.      Heart sounds: Normal heart sounds. No murmur heard.     No friction rub. No gallop.   Pulmonary:      Effort: Pulmonary effort is normal. No respiratory distress.      Breath sounds: Normal breath sounds. No  wheezing.   Abdominal:      General: Bowel sounds are normal. There is no distension.      Palpations: Abdomen is soft.      Tenderness: There is no abdominal tenderness.   Musculoskeletal:         General: No deformity or signs of injury.      Cervical back: Normal range of motion and neck supple.      Comments: Right hip with bandages in place, minimal RLE edema   Lymphadenopathy:      Cervical: No cervical adenopathy.   Skin:     General: Skin is warm and dry.      Findings: No rash.   Neurological:      Mental Status: He is alert and oriented to person, place, and time.   Psychiatric:         Mood and Affect: Mood normal.         Behavior: Behavior normal.         RECORDS REVIEW:   Discharge Summary [facility] 4/11/2025  Results  Labs   - Hemoglobin: 04/11/2025, 11.1   - MCV: 04/11/2025, 100.9   - WBC: 04/11/2025, 9.35   - Creatinine: 04/11/2025, 1.24   - Potassium: 04/11/2025, 4.4   - Sodium: 04/11/2025, 137   - A1c: 04/11/2025, 5.4   - Lipids: 04/11/2025, Normal   - TSH: 04/11/2025, 1.32    ASSESSMENT   Diagnoses and all orders for this visit:    1. Closed displaced fracture of right femoral neck (Primary)    2. Stage 3a chronic kidney disease    3. Seasonal allergic rhinitis due to other allergic trigger    4. Type 2 diabetes mellitus with diabetic autonomic neuropathy, without long-term current use of insulin    5. Anxiety and depression        Assessment & Plan  1. Right femoral neck fracture status post surgical fixation.  - Pain control is managed with acetaminophen and oxycodone as needed.  - Physical examination reveals balanced swelling in both legs.  - Follow-up with orthopedics is scheduled for 04/17/2025; patient advised to use compression and keep legs elevated.  - Continue physical therapy (PT), occupational therapy (OT), and rehabilitation at the facility. Lovenox 40 mg subcutaneous for DVT prophylaxis is to be continued per orthopedic recommendations.    2. Chronic Kidney Disease (CKD) stage  IIIa.  - Renal function is stable at this time.  - Creatinine level improved to 1.24 with intravenous fluids.  - Patient encouraged to stay hydrated.  - No additional treatment changes needed at this time.    3. Allergic rhinitis.  - Symptoms noted during hospitalization.  - Continue nasal saline and azelastine nasal sprays as needed.  - Continue loratadine for symptom management.  - No new symptoms reported.    4. Neuropathy.  - Continue gabapentin for symptom management.  - No new symptoms or changes in condition reported.  - Patient reports stable condition with current medication.  - No additional treatment changes needed at this time.    5. Anxiety / Depression   - cont Lexapro and Remeron nightly. (Home regimen)         [x]  Discussed Patient in detail with nursing/staff, addressed all needs today.     [x]  Plan of Care Reviewed   [x]  PT/OT Reviewed   []  Order Changes  []  Discharge Plans Reviewed  [x]  Advance Directive on file with Nursing Home.   [x]  POA on file with Nursing Home.    [x]  Code Status listed and reviewed.     Osbaldo Mckeon DO.  4/20/2025      **Part of this note may be an electronic transcription/translation of spoken language to printed text using the Dragon Dictation System.**    Patient or patient representative verbalized consent for the use of Ambient Listening during the visit with  Osbaldo Mckeon DO for chart documentation. 4/20/2025  12:49 EDT